# Patient Record
Sex: MALE | Race: WHITE | NOT HISPANIC OR LATINO | Employment: PART TIME | URBAN - METROPOLITAN AREA
[De-identification: names, ages, dates, MRNs, and addresses within clinical notes are randomized per-mention and may not be internally consistent; named-entity substitution may affect disease eponyms.]

---

## 2023-02-22 LAB — PROSTATE SPECIFIC AG (NG/ML) IN SER/PLAS: 4.99 NG/ML (ref 0–4)

## 2023-07-07 DIAGNOSIS — I10 BENIGN ESSENTIAL HYPERTENSION: ICD-10-CM

## 2023-07-07 DIAGNOSIS — M54.16 LUMBAR RADICULOPATHY: ICD-10-CM

## 2023-07-07 RX ORDER — AMLODIPINE BESYLATE 10 MG/1
TABLET ORAL
Qty: 90 TABLET | Refills: 3 | Status: SHIPPED | OUTPATIENT
Start: 2023-07-07

## 2023-07-07 RX ORDER — MELOXICAM 15 MG/1
TABLET ORAL
Qty: 90 TABLET | Refills: 3 | Status: SHIPPED | OUTPATIENT
Start: 2023-07-07

## 2023-07-07 RX ORDER — LOSARTAN POTASSIUM 50 MG/1
TABLET ORAL
Qty: 90 TABLET | Refills: 3 | Status: SHIPPED | OUTPATIENT
Start: 2023-07-07 | End: 2023-08-30

## 2023-08-04 DIAGNOSIS — F41.1 GENERALIZED ANXIETY DISORDER: ICD-10-CM

## 2023-08-04 RX ORDER — ESCITALOPRAM OXALATE 20 MG/1
20 TABLET ORAL DAILY
COMMUNITY
Start: 2023-03-17 | End: 2023-08-04 | Stop reason: SDUPTHER

## 2023-08-04 NOTE — TELEPHONE ENCOUNTER
Rx Refill Request Telephone Encounter    Name:  Nelson Kulkarni  :  [id number]  Medication Name:    Escitalopram 20mg 1 tab every day - 90 day     [facility]  Date of last appointment:  2022  Date of next appointment:  23

## 2023-08-05 RX ORDER — ESCITALOPRAM OXALATE 20 MG/1
20 TABLET ORAL DAILY
Qty: 90 TABLET | Refills: 0 | Status: SHIPPED | OUTPATIENT
Start: 2023-08-05 | End: 2023-08-30 | Stop reason: ALTCHOICE

## 2023-08-10 ENCOUNTER — TELEPHONE (OUTPATIENT)
Dept: PRIMARY CARE | Facility: CLINIC | Age: 68
End: 2023-08-10
Payer: COMMERCIAL

## 2023-08-10 DIAGNOSIS — F41.1 GENERALIZED ANXIETY DISORDER: ICD-10-CM

## 2023-08-10 NOTE — TELEPHONE ENCOUNTER
PT states that [facility] will not refill escitalopram (Lexapro) 20 mg tablet with out clarification or confirmation. Requesting the refill.

## 2023-08-23 PROBLEM — R53.81 MALAISE AND FATIGUE: Status: ACTIVE | Noted: 2023-08-23

## 2023-08-23 PROBLEM — M54.50 LOW BACK PAIN: Status: ACTIVE | Noted: 2023-08-23

## 2023-08-23 PROBLEM — M54.32 SCIATICA OF LEFT SIDE: Status: ACTIVE | Noted: 2023-08-23

## 2023-08-23 PROBLEM — B02.29 POSTHERPETIC NEURALGIA: Status: ACTIVE | Noted: 2023-08-23

## 2023-08-23 PROBLEM — R53.83 MALAISE AND FATIGUE: Status: ACTIVE | Noted: 2023-08-23

## 2023-08-23 PROBLEM — E78.2 COMBINED HYPERLIPIDEMIA: Status: ACTIVE | Noted: 2023-08-23

## 2023-08-23 PROBLEM — R29.3 ABNORMAL POSTURE: Status: ACTIVE | Noted: 2023-08-23

## 2023-08-23 PROBLEM — G47.01 INSOMNIA DUE TO MEDICAL CONDITION: Status: ACTIVE | Noted: 2023-08-23

## 2023-08-23 PROBLEM — I10 BENIGN ESSENTIAL HYPERTENSION: Status: ACTIVE | Noted: 2023-08-23

## 2023-08-23 PROBLEM — M47.12 CERVICAL ARTHRITIS WITH MYELOPATHY: Status: ACTIVE | Noted: 2023-08-23

## 2023-08-23 PROBLEM — M51.26 LUMBAR HERNIATED DISC: Status: ACTIVE | Noted: 2023-08-23

## 2023-08-23 PROBLEM — M54.16 LUMBAR RADICULOPATHY: Status: ACTIVE | Noted: 2023-08-23

## 2023-08-23 PROBLEM — B02.9 SHINGLES: Status: RESOLVED | Noted: 2023-08-23 | Resolved: 2023-08-23

## 2023-08-23 RX ORDER — ESCITALOPRAM OXALATE 10 MG/1
10 TABLET ORAL DAILY
COMMUNITY
Start: 2023-08-10 | End: 2024-01-10 | Stop reason: SDUPTHER

## 2023-08-23 RX ORDER — SILDENAFIL CITRATE 20 MG/1
TABLET ORAL
COMMUNITY
Start: 2018-12-30

## 2023-08-23 RX ORDER — CYCLOBENZAPRINE HCL 10 MG
1 TABLET ORAL NIGHTLY PRN
COMMUNITY
Start: 2019-12-10 | End: 2023-08-30 | Stop reason: ALTCHOICE

## 2023-08-23 RX ORDER — ALPRAZOLAM 1 MG/1
TABLET ORAL
COMMUNITY

## 2023-08-29 ASSESSMENT — PROMIS GLOBAL HEALTH SCALE
RATE_PHYSICAL_HEALTH: VERY GOOD
RATE_QUALITY_OF_LIFE: VERY GOOD
RATE_MENTAL_HEALTH: VERY GOOD
EMOTIONAL_PROBLEMS: SOMETIMES
RATE_SOCIAL_SATISFACTION: VERY GOOD
RATE_AVERAGE_FATIGUE: MILD
RATE_AVERAGE_PAIN: 2
CARRYOUT_PHYSICAL_ACTIVITIES: COMPLETELY
CARRYOUT_SOCIAL_ACTIVITIES: VERY GOOD
RATE_GENERAL_HEALTH: VERY GOOD

## 2023-08-30 ENCOUNTER — OFFICE VISIT (OUTPATIENT)
Dept: PRIMARY CARE | Facility: CLINIC | Age: 68
End: 2023-08-30
Payer: COMMERCIAL

## 2023-08-30 VITALS
OXYGEN SATURATION: 97 % | HEART RATE: 57 BPM | SYSTOLIC BLOOD PRESSURE: 160 MMHG | WEIGHT: 192 LBS | HEIGHT: 71 IN | BODY MASS INDEX: 26.88 KG/M2 | DIASTOLIC BLOOD PRESSURE: 75 MMHG

## 2023-08-30 DIAGNOSIS — M48.061 SPINAL STENOSIS OF LUMBAR REGION WITHOUT NEUROGENIC CLAUDICATION: ICD-10-CM

## 2023-08-30 DIAGNOSIS — I10 BENIGN ESSENTIAL HYPERTENSION: ICD-10-CM

## 2023-08-30 DIAGNOSIS — Z00.00 WELL ADULT EXAM: Primary | ICD-10-CM

## 2023-08-30 DIAGNOSIS — L25.5 RHUS DERMATITIS: ICD-10-CM

## 2023-08-30 DIAGNOSIS — T78.40XS ALLERGY, SEQUELA: ICD-10-CM

## 2023-08-30 DIAGNOSIS — M51.26 LUMBAR HERNIATED DISC: ICD-10-CM

## 2023-08-30 DIAGNOSIS — R97.20 ELEVATED PSA: ICD-10-CM

## 2023-08-30 DIAGNOSIS — E78.2 COMBINED HYPERLIPIDEMIA: ICD-10-CM

## 2023-08-30 PROCEDURE — 1159F MED LIST DOCD IN RCRD: CPT | Performed by: FAMILY MEDICINE

## 2023-08-30 PROCEDURE — 1160F RVW MEDS BY RX/DR IN RCRD: CPT | Performed by: FAMILY MEDICINE

## 2023-08-30 PROCEDURE — 99397 PER PM REEVAL EST PAT 65+ YR: CPT | Performed by: FAMILY MEDICINE

## 2023-08-30 PROCEDURE — 3078F DIAST BP <80 MM HG: CPT | Performed by: FAMILY MEDICINE

## 2023-08-30 PROCEDURE — 3077F SYST BP >= 140 MM HG: CPT | Performed by: FAMILY MEDICINE

## 2023-08-30 PROCEDURE — 1036F TOBACCO NON-USER: CPT | Performed by: FAMILY MEDICINE

## 2023-08-30 RX ORDER — GABAPENTIN 300 MG/1
300 CAPSULE ORAL DAILY
Qty: 90 CAPSULE | Refills: 3
Start: 2023-08-30 | End: 2023-10-17 | Stop reason: SDUPTHER

## 2023-08-30 RX ORDER — TRIAMCINOLONE ACETONIDE 5 MG/G
CREAM TOPICAL 3 TIMES DAILY
Qty: 30 G | Refills: 1 | Status: SHIPPED | OUTPATIENT
Start: 2023-08-30 | End: 2023-09-29

## 2023-08-30 RX ORDER — EPINEPHRINE 0.3 MG/.3ML
1 INJECTION INTRAMUSCULAR AS NEEDED
COMMUNITY
End: 2023-08-30 | Stop reason: SDUPTHER

## 2023-08-30 RX ORDER — EPINEPHRINE 0.3 MG/.3ML
1 INJECTION INTRAMUSCULAR AS NEEDED
Qty: 1 EACH | Refills: 1 | Status: SHIPPED | OUTPATIENT
Start: 2023-08-30

## 2023-08-30 RX ORDER — PREDNISONE 10 MG/1
TABLET ORAL
Qty: 14 TABLET | Refills: 0 | Status: SHIPPED | OUTPATIENT
Start: 2023-08-30 | End: 2023-09-07

## 2023-08-30 RX ORDER — LOSARTAN POTASSIUM AND HYDROCHLOROTHIAZIDE 12.5; 1 MG/1; MG/1
1 TABLET ORAL DAILY
Qty: 90 TABLET | Refills: 3 | Status: SHIPPED | OUTPATIENT
Start: 2023-08-30 | End: 2024-08-29

## 2023-08-30 NOTE — PROGRESS NOTES
ROS :  ( No or Yes )  Do you have:  Any eye problems:    N  2. Frequent nasal congestion or sneezing:  Y, ever since covid, allergy pills help  3. Difficulty hearing:  N  4. Ear problems:   N  Are you troubled by:  5. Asthma or wheezing:   N  6. Frequent cough:   N  7. Shortness of breath:N  8. Hemoptysis: N  9. Hx of TB: N  Do you have or have you been told you had:  10. High blood pressure: Y, treated  11. Heart disease: N  12. Heart murmur: N  Do you ever have:  13. Chest pain or pressure with exertion:N  14. Leg pains with walking up hill: N  15. Fast heartbeat or palpitations:N  16. Varicose veins: Y  Do you have or are you troubled by:  17. Difficulty swallowing foods or liquids: N  18. Abdominal pains: N  19. Frequent indigestion or heartburn: N  20. Constipation: N  21. Diarrhea or loose stools: N  Has there been a definite change:  22. In weight recently: N  23. In bowel movements: Y, not as often as used to since 1/2023  Have you ever had or been told you have:  24. An ulcer: N  25. Black stools: N  26. Jaundice, hepatitis or liver problems: N  27. Gallstones or gallbladder problems: N  28. Stomach or intestinal problems: N  Have you ever:  29. Vomited blood : N  30. Blood in bowel movements: N  31. Been anemic or been treated for blood problems: N  32. Had sickle cell trait or anemia: N  33. Been refused as a blood donor:   Have you had or do you have:  34. Problems with your kidney, bladder, or prostate: Y, elevated psa  35. Loss of control of your urine: N  36. Pain or burning with urination: N  37. Blood in your urine: N  38. Trouble starting flow of urine: N  39. Frequent urination at night: N  Have you ever been treated for or told you had:  40. Venereal disease: N  Do you have:  41. Any skin problems: Y, eczema nose and eyebrows, cortosone 10 helps, worse in winter  42. Diabetes: N  43. Thyroid disease: N  Are you troubled by:  44. Frequent back pain: Y, hx spinal stenosis and sciatica  45. Pain or  "swelling around joints: N  Have you ever:  46. Broken any bones: Y  Are you troubled by:  47. Frequent headaches: N  48. Dizziness: N  49. Had Seizures or convulsions: N  50. Temporarily lost control of your hand or foot : N   51. Had a stroke or been paralyzed : N  52. Temporarily lost your ability to speak: N  53. Fainted or lost consciousness: N  Have you ever had:  54. Hallucinations: N  55. Much trouble with Nervousness: Y  56. Do you take medications for your nerves: Y  57. Trouble falling asleep or staying asleep: N  58. Do you feel tired even after a good night sleep: N  59. Do you often feel down in the dumps or depressed: N  60. Do you often feel like crying without any reason: N  61. Do you think that you may be using alcohol excessively: N  62. Do you use any street drugs : N    Do have any other medical problems that are concerning to you :  Subjective   Patient ID: Nelson Kulkarni is a 68 y.o. male who presents for Annual Exam (Since having COVID [place], sinus have been draining ever since, clear drainage as soon as he gets up in the am draining down the back of his throat and nose. /Has had more back pain recently \"like sciatica lightening goes down both cheeks\", usually stretches helps, but it isn't now).    HPI     Review of Systems    Objective   /76   Pulse 57   Ht 1.791 m (5' 10.5\")   Wt 87.1 kg (192 lb)   SpO2 97%   BMI 27.16 kg/m²     Physical Exam  HENT:      Head: Normocephalic and atraumatic.      Nose: Nose normal.      Mouth/Throat:      Mouth: Mucous membranes are moist.      Pharynx: No oropharyngeal exudate.   Eyes:      Extraocular Movements: Extraocular movements intact.      Conjunctiva/sclera: Conjunctivae normal.      Pupils: Pupils are equal, round, and reactive to light.   Cardiovascular:      Rate and Rhythm: Normal rate and regular rhythm.   Pulmonary:      Effort: Pulmonary effort is normal.   Abdominal:      General: There is no distension.      Palpations: " Abdomen is soft.   Musculoskeletal:      Cervical back: Normal range of motion and neck supple.   Lymphadenopathy:      Cervical: No cervical adenopathy.   Neurological:      General: No focal deficit present.      Mental Status: He is alert.   Psychiatric:         Attention and Perception: Attention normal.         Speech: Speech normal.         Behavior: Behavior is cooperative.       Few erythematous excoriated papules over the right ventral forearm, and left dorsal leg has a linear arrangement of 1 to 2 mm diameter papules  Assessment/Plan   Diagnoses and all orders for this visit:  Well adult exam  Allergy, sequela  -     EPINEPHrine (EpiPen 2-Grant) 0.3 mg/0.3 mL injection syringe; Inject 0.3 mL (0.3 mg) as directed if needed for anaphylaxis. Inject into upper leg. Call 911 after use.  Rhus dermatitis  -     triamcinolone (Kenalog) 0.5 % cream; Apply topically 3 times a day.  Spinal stenosis of lumbar region without neurogenic claudication  -     predniSONE (Deltasone) 10 mg tablet; Take 1 tablet (10 mg) by mouth 3 times a day for 2 days, THEN 1 tablet (10 mg) 2 times a day for 2 days, THEN 1 tablet (10 mg) once daily for 4 days.  Benign essential hypertension  -     losartan-hydrochlorothiazide (Hyzaar) 100-12.5 mg tablet; Take 1 tablet by mouth once daily.  -     Lipid Panel; Future  -     CBC and Auto Differential; Future  -     Comprehensive Metabolic Panel; Future  Elevated PSA  -     Prostate Specific Antigen; Future  Combined hyperlipidemia  -     Lipid Panel; Future  -     CBC and Auto Differential; Future  -     Comprehensive Metabolic Panel; Future  Lumbar herniated disc  -     gabapentin (Neurontin) 300 mg capsule; Take 1 capsule (300 mg) by mouth once daily.   LM discussed  Call blood pressures in 2 weeks  Recheck 6 to 12 months sooner if any issues arise

## 2023-10-05 ENCOUNTER — HOSPITAL ENCOUNTER (EMERGENCY)
Facility: HOSPITAL | Age: 68
Discharge: HOME | End: 2023-10-05
Attending: STUDENT IN AN ORGANIZED HEALTH CARE EDUCATION/TRAINING PROGRAM | Admitting: STUDENT IN AN ORGANIZED HEALTH CARE EDUCATION/TRAINING PROGRAM
Payer: COMMERCIAL

## 2023-10-05 VITALS
OXYGEN SATURATION: 97 % | TEMPERATURE: 97.7 F | RESPIRATION RATE: 16 BRPM | HEART RATE: 84 BPM | SYSTOLIC BLOOD PRESSURE: 170 MMHG | DIASTOLIC BLOOD PRESSURE: 84 MMHG | BODY MASS INDEX: 29.55 KG/M2 | HEIGHT: 68 IN | WEIGHT: 195 LBS

## 2023-10-05 DIAGNOSIS — T63.441A ALLERGIC REACTION TO BEE STING: Primary | ICD-10-CM

## 2023-10-05 PROCEDURE — 96374 THER/PROPH/DIAG INJ IV PUSH: CPT

## 2023-10-05 PROCEDURE — 2500000004 HC RX 250 GENERAL PHARMACY W/ HCPCS (ALT 636 FOR OP/ED): Performed by: HEALTH CARE PROVIDER

## 2023-10-05 PROCEDURE — 96375 TX/PRO/DX INJ NEW DRUG ADDON: CPT

## 2023-10-05 PROCEDURE — 99284 EMERGENCY DEPT VISIT MOD MDM: CPT | Mod: 25 | Performed by: STUDENT IN AN ORGANIZED HEALTH CARE EDUCATION/TRAINING PROGRAM

## 2023-10-05 RX ORDER — FAMOTIDINE 10 MG/ML
20 INJECTION INTRAVENOUS ONCE
Status: COMPLETED | OUTPATIENT
Start: 2023-10-05 | End: 2023-10-05

## 2023-10-05 RX ADMIN — METHYLPREDNISOLONE SODIUM SUCCINATE 125 MG: 125 INJECTION, POWDER, FOR SOLUTION INTRAMUSCULAR; INTRAVENOUS at 18:13

## 2023-10-05 RX ADMIN — FAMOTIDINE 20 MG: 10 INJECTION, SOLUTION INTRAVENOUS at 18:14

## 2023-10-05 ASSESSMENT — LIFESTYLE VARIABLES
EVER HAD A DRINK FIRST THING IN THE MORNING TO STEADY YOUR NERVES TO GET RID OF A HANGOVER: NO
HAVE PEOPLE ANNOYED YOU BY CRITICIZING YOUR DRINKING: NO
HAVE YOU EVER FELT YOU SHOULD CUT DOWN ON YOUR DRINKING: NO
EVER FELT BAD OR GUILTY ABOUT YOUR DRINKING: NO

## 2023-10-05 ASSESSMENT — COLUMBIA-SUICIDE SEVERITY RATING SCALE - C-SSRS
2. HAVE YOU ACTUALLY HAD ANY THOUGHTS OF KILLING YOURSELF?: NO
1. IN THE PAST MONTH, HAVE YOU WISHED YOU WERE DEAD OR WISHED YOU COULD GO TO SLEEP AND NOT WAKE UP?: NO
6. HAVE YOU EVER DONE ANYTHING, STARTED TO DO ANYTHING, OR PREPARED TO DO ANYTHING TO END YOUR LIFE?: NO

## 2023-10-05 ASSESSMENT — PAIN SCALES - GENERAL: PAINLEVEL_OUTOF10: 0 - NO PAIN

## 2023-10-05 ASSESSMENT — PAIN - FUNCTIONAL ASSESSMENT: PAIN_FUNCTIONAL_ASSESSMENT: 0-10

## 2023-10-05 NOTE — ED NOTES
Patient was sitting on  and got stung by a bee on his leg. Known allergy to bees. Gave self Epi pen. Ambulated to room Call light in reach No complaints of CP/SOB     Rachid Escudero RN  10/05/23 [id number]

## 2023-10-17 DIAGNOSIS — M51.26 LUMBAR HERNIATED DISC: ICD-10-CM

## 2023-10-17 RX ORDER — GABAPENTIN 300 MG/1
300 CAPSULE ORAL DAILY
Qty: 90 CAPSULE | Refills: 1 | Status: SHIPPED | OUTPATIENT
Start: 2023-10-17 | End: 2024-04-08

## 2023-10-17 NOTE — TELEPHONE ENCOUNTER
MEDICATION REFILL    Pharmacy Name:  [facility]  Medication requested    Gabapentin  300 mg  Dosage    1 tab daily  Quantity     90 days    Allergies     NKA  Date of last visit    08/30/2023  Date of Next Appointment [  ]

## 2024-01-05 ENCOUNTER — ANCILLARY PROCEDURE (OUTPATIENT)
Dept: RADIOLOGY | Facility: CLINIC | Age: 69
End: 2024-01-05
Payer: COMMERCIAL

## 2024-01-05 ENCOUNTER — OFFICE VISIT (OUTPATIENT)
Dept: PRIMARY CARE | Facility: CLINIC | Age: 69
End: 2024-01-05
Payer: COMMERCIAL

## 2024-01-05 VITALS
BODY MASS INDEX: 30.01 KG/M2 | HEART RATE: 77 BPM | HEIGHT: 68 IN | OXYGEN SATURATION: 98 % | SYSTOLIC BLOOD PRESSURE: 162 MMHG | DIASTOLIC BLOOD PRESSURE: 80 MMHG | WEIGHT: 198 LBS

## 2024-01-05 DIAGNOSIS — M48.061 SPINAL STENOSIS OF LUMBAR REGION, UNSPECIFIED WHETHER NEUROGENIC CLAUDICATION PRESENT: ICD-10-CM

## 2024-01-05 DIAGNOSIS — E78.2 COMBINED HYPERLIPIDEMIA: ICD-10-CM

## 2024-01-05 DIAGNOSIS — M48.061 SPINAL STENOSIS OF LUMBAR REGION, UNSPECIFIED WHETHER NEUROGENIC CLAUDICATION PRESENT: Primary | ICD-10-CM

## 2024-01-05 DIAGNOSIS — M54.31 SCIATICA OF RIGHT SIDE: ICD-10-CM

## 2024-01-05 DIAGNOSIS — I10 BENIGN ESSENTIAL HYPERTENSION: ICD-10-CM

## 2024-01-05 DIAGNOSIS — R97.20 ELEVATED PSA: ICD-10-CM

## 2024-01-05 PROCEDURE — 72100 X-RAY EXAM L-S SPINE 2/3 VWS: CPT

## 2024-01-05 PROCEDURE — 72100 X-RAY EXAM L-S SPINE 2/3 VWS: CPT | Performed by: RADIOLOGY

## 2024-01-05 PROCEDURE — 3079F DIAST BP 80-89 MM HG: CPT | Performed by: FAMILY MEDICINE

## 2024-01-05 PROCEDURE — 99214 OFFICE O/P EST MOD 30 MIN: CPT | Performed by: FAMILY MEDICINE

## 2024-01-05 PROCEDURE — 1036F TOBACCO NON-USER: CPT | Performed by: FAMILY MEDICINE

## 2024-01-05 PROCEDURE — 3077F SYST BP >= 140 MM HG: CPT | Performed by: FAMILY MEDICINE

## 2024-01-05 PROCEDURE — 1159F MED LIST DOCD IN RCRD: CPT | Performed by: FAMILY MEDICINE

## 2024-01-05 PROCEDURE — 1126F AMNT PAIN NOTED NONE PRSNT: CPT | Performed by: FAMILY MEDICINE

## 2024-01-05 ASSESSMENT — PATIENT HEALTH QUESTIONNAIRE - PHQ9
SUM OF ALL RESPONSES TO PHQ9 QUESTIONS 1 AND 2: 0
2. FEELING DOWN, DEPRESSED OR HOPELESS: NOT AT ALL
1. LITTLE INTEREST OR PLEASURE IN DOING THINGS: NOT AT ALL

## 2024-01-05 ASSESSMENT — ENCOUNTER SYMPTOMS
UNEXPECTED WEIGHT CHANGE: 0
CONSTIPATION: 0
VOMITING: 0
PALPITATIONS: 0
HIP PAIN: 1

## 2024-01-05 ASSESSMENT — COLUMBIA-SUICIDE SEVERITY RATING SCALE - C-SSRS
1. IN THE PAST MONTH, HAVE YOU WISHED YOU WERE DEAD OR WISHED YOU COULD GO TO SLEEP AND NOT WAKE UP?: NO
2. HAVE YOU ACTUALLY HAD ANY THOUGHTS OF KILLING YOURSELF?: NO
6. HAVE YOU EVER DONE ANYTHING, STARTED TO DO ANYTHING, OR PREPARED TO DO ANYTHING TO END YOUR LIFE?: NO

## 2024-01-05 NOTE — PROGRESS NOTES
"Subjective   Patient ID: Nelson Kulkarni is a 68 y.o. male who presents for Hip Pain (Right hip for about 2 weeks).    Hip Pain     Pain is in the right lateral hip and radiates occasionally into the right leg and right foot  Patient says he did slip on snow 1 to 2 months ago and stopped up short but did not fall  No change in bowel or bladder and no numbness tingling weakness  Pain resolves promptly with sitting down or bending forward  No skin rash  No fever chills    Review of Systems   Constitutional:  Negative for unexpected weight change.   HENT:  Negative for congestion and ear discharge.    Cardiovascular:  Negative for chest pain and palpitations.   Gastrointestinal:  Negative for constipation and vomiting.   All other systems reviewed and are negative.      Objective   /80   Pulse 77   Ht 1.727 m (5' 8\")   Wt 89.8 kg (198 lb)   SpO2 98%   BMI 30.11 kg/m²     Physical Exam  HENT:      Head: Normocephalic and atraumatic.      Nose: Nose normal.      Mouth/Throat:      Mouth: Mucous membranes are moist.      Pharynx: No oropharyngeal exudate.   Eyes:      Extraocular Movements: Extraocular movements intact.      Conjunctiva/sclera: Conjunctivae normal.      Pupils: Pupils are equal, round, and reactive to light.   Cardiovascular:      Rate and Rhythm: Normal rate and regular rhythm.   Pulmonary:      Effort: Pulmonary effort is normal.   Abdominal:      General: There is no distension.      Palpations: Abdomen is soft.   Musculoskeletal:      Cervical back: Normal range of motion and neck supple.   Lymphadenopathy:      Cervical: No cervical adenopathy.   Neurological:      General: No focal deficit present.      Mental Status: He is alert.   Psychiatric:         Attention and Perception: Attention normal.         Speech: Speech normal.         Behavior: Behavior is cooperative.     There is slight tenderness in the bilateral SI joints, no skin change, no pain with hip rotation or SLR, DTRs 2+ knee " and ankle on the left but 0 to trace on the right which she says is longstanding  Light touch and motor intact lower extremities    Assessment/Plan   Diagnoses and all orders for this visit:  Spinal stenosis of lumbar region, unspecified whether neurogenic claudication present  Comments:  Recommend start home PT and consider formal PT eval  Plan x-rays  Orders:  -     XR lumbar spine 2-3 views; Future  Sciatica of right side  -     XR lumbar spine 2-3 views; Future  Benign essential hypertension  Comments:  Order given for labs again since he did not complete them last time  Orders:  -     Lipid Panel; Future  -     CBC and Auto Differential; Future  -     Comprehensive Metabolic Panel; Future  Combined hyperlipidemia  -     Lipid Panel; Future  -     CBC and Auto Differential; Future  -     Comprehensive Metabolic Panel; Future  Elevated PSA  -     Prostate Specific Antigen; Future    Recheck 1 to 2 weeks if not better sooner if worse

## 2024-01-05 NOTE — LETTER
January 5, 2024     Patient: Nelson Kulkarni   YOB: 1955   Date of Visit: 1/5/2024       To Whom It May Concern:    Nelson Kulkarni was seen in my clinic on 1/5/2024 at 9:45 am. Please excuse Nelson for his absence from work on this day to make the appointment. He is released to return to work 1/13/2024 without restrictions.     If you have any questions or concerns, please don't hesitate to call.         Sincerely,         Booker Ford MD        CC: No Recipients

## 2024-01-10 DIAGNOSIS — F41.1 GENERALIZED ANXIETY DISORDER: ICD-10-CM

## 2024-01-10 DIAGNOSIS — M48.061 SPINAL STENOSIS OF LUMBAR REGION, UNSPECIFIED WHETHER NEUROGENIC CLAUDICATION PRESENT: Primary | ICD-10-CM

## 2024-01-10 RX ORDER — ESCITALOPRAM OXALATE 10 MG/1
10 TABLET ORAL DAILY
Qty: 90 TABLET | Refills: 1 | Status: SHIPPED | OUTPATIENT
Start: 2024-01-10

## 2024-01-10 NOTE — TELEPHONE ENCOUNTER
Rx Refill Request Telephone Encounter    Name:  Nelson Kulkarni  :  [id number]  Medication Name:    escitalopram (Lexapro) 10 mg tablet Take 1 tablet (10 mg) by mouth once daily. - 90 day     [facility]  Date of last appointment:  2024  Date of next appointment:  na  Best number to reach patient:  [phone]

## 2024-01-15 ENCOUNTER — TELEPHONE (OUTPATIENT)
Dept: PRIMARY CARE | Facility: CLINIC | Age: 69
End: 2024-01-15
Payer: COMMERCIAL

## 2024-01-15 NOTE — TELEPHONE ENCOUNTER
Pt states his hip pain is worsening with stretching.  He would like to see if Dr LAM would extend his off work letter to include this week with return to work on 01/22/2024.  Someone will  from office when/if written.

## 2024-01-15 NOTE — TELEPHONE ENCOUNTER
Spoke with pt. He does have an appt. With Dr. Sloan just wishes it was sooner. Asked if he would like me to [facility] and he [facility]. Letter sent.

## 2024-01-15 NOTE — LETTER
January 15, 2024     Patient: Nelson Kulkarni   YOB: 1955   Date of Visit: 1/5/2024       To Whom It May Concern:    Nelson Kulkarni was seen in my clinic on 1/5/2024 ?. Please excuse Nelson for his absence from work on this day to make the appointment. Due to pain he is still unable to return to work this week and will have to extend his return to work date to 1/22/2024    If you have any questions or concerns, please don't hesitate to call.         Sincerely,         Booker Ford MD

## 2024-01-19 ENCOUNTER — LAB (OUTPATIENT)
Dept: LAB | Facility: LAB | Age: 69
End: 2024-01-19
Payer: COMMERCIAL

## 2024-01-19 DIAGNOSIS — E78.2 COMBINED HYPERLIPIDEMIA: ICD-10-CM

## 2024-01-19 DIAGNOSIS — R97.20 ELEVATED PSA: ICD-10-CM

## 2024-01-19 DIAGNOSIS — I10 BENIGN ESSENTIAL HYPERTENSION: ICD-10-CM

## 2024-01-19 LAB
ALBUMIN SERPL BCP-MCNC: 4.4 G/DL (ref 3.4–5)
ALP SERPL-CCNC: 50 U/L (ref 33–136)
ALT SERPL W P-5'-P-CCNC: 28 U/L (ref 10–52)
ANION GAP SERPL CALC-SCNC: 16 MMOL/L (ref 10–20)
AST SERPL W P-5'-P-CCNC: 22 U/L (ref 9–39)
BASOPHILS # BLD AUTO: 0.05 X10*3/UL (ref 0–0.1)
BASOPHILS NFR BLD AUTO: 0.5 %
BILIRUB SERPL-MCNC: 0.8 MG/DL (ref 0–1.2)
BUN SERPL-MCNC: 23 MG/DL (ref 6–23)
CALCIUM SERPL-MCNC: 9.5 MG/DL (ref 8.6–10.3)
CHLORIDE SERPL-SCNC: 102 MMOL/L (ref 98–107)
CHOLEST SERPL-MCNC: 242 MG/DL (ref 0–199)
CHOLESTEROL/HDL RATIO: 2.3
CO2 SERPL-SCNC: 23 MMOL/L (ref 21–32)
CREAT SERPL-MCNC: 0.96 MG/DL (ref 0.5–1.3)
EGFRCR SERPLBLD CKD-EPI 2021: 87 ML/MIN/1.73M*2
EOSINOPHIL # BLD AUTO: 0.06 X10*3/UL (ref 0–0.7)
EOSINOPHIL NFR BLD AUTO: 0.6 %
ERYTHROCYTE [DISTWIDTH] IN BLOOD BY AUTOMATED COUNT: 12 % (ref 11.5–14.5)
GLUCOSE SERPL-MCNC: 75 MG/DL (ref 74–99)
HCT VFR BLD AUTO: 44.9 % (ref 41–52)
HDLC SERPL-MCNC: 106.1 MG/DL
HGB BLD-MCNC: 15.6 G/DL (ref 13.5–17.5)
IMM GRANULOCYTES # BLD AUTO: 0.03 X10*3/UL (ref 0–0.7)
IMM GRANULOCYTES NFR BLD AUTO: 0.3 % (ref 0–0.9)
LDLC SERPL CALC-MCNC: 126 MG/DL
LYMPHOCYTES # BLD AUTO: 3.97 X10*3/UL (ref 1.2–4.8)
LYMPHOCYTES NFR BLD AUTO: 41.4 %
MCH RBC QN AUTO: 34.8 PG (ref 26–34)
MCHC RBC AUTO-ENTMCNC: 34.7 G/DL (ref 32–36)
MCV RBC AUTO: 100 FL (ref 80–100)
MONOCYTES # BLD AUTO: 0.84 X10*3/UL (ref 0.1–1)
MONOCYTES NFR BLD AUTO: 8.8 %
NEUTROPHILS # BLD AUTO: 4.63 X10*3/UL (ref 1.2–7.7)
NEUTROPHILS NFR BLD AUTO: 48.4 %
NON HDL CHOLESTEROL: 136 MG/DL (ref 0–149)
NRBC BLD-RTO: 0 /100 WBCS (ref 0–0)
PLATELET # BLD AUTO: 260 X10*3/UL (ref 150–450)
POTASSIUM SERPL-SCNC: 3.8 MMOL/L (ref 3.5–5.3)
PROT SERPL-MCNC: 7 G/DL (ref 6.4–8.2)
PSA SERPL-MCNC: 6.57 NG/ML
RBC # BLD AUTO: 4.48 X10*6/UL (ref 4.5–5.9)
SODIUM SERPL-SCNC: 137 MMOL/L (ref 136–145)
TRIGL SERPL-MCNC: 50 MG/DL (ref 0–149)
VLDL: 10 MG/DL (ref 0–40)
WBC # BLD AUTO: 9.6 X10*3/UL (ref 4.4–11.3)

## 2024-01-19 PROCEDURE — 84153 ASSAY OF PSA TOTAL: CPT

## 2024-01-19 PROCEDURE — 85025 COMPLETE CBC W/AUTO DIFF WBC: CPT

## 2024-01-19 PROCEDURE — 36415 COLL VENOUS BLD VENIPUNCTURE: CPT

## 2024-01-19 PROCEDURE — 80061 LIPID PANEL: CPT

## 2024-01-19 PROCEDURE — 80053 COMPREHEN METABOLIC PANEL: CPT

## 2024-01-22 ENCOUNTER — TELEPHONE (OUTPATIENT)
Dept: PRIMARY CARE | Facility: CLINIC | Age: 69
End: 2024-01-22
Payer: COMMERCIAL

## 2024-02-02 ENCOUNTER — OFFICE VISIT (OUTPATIENT)
Dept: ORTHOPEDIC SURGERY | Facility: CLINIC | Age: 69
End: 2024-02-02
Payer: COMMERCIAL

## 2024-02-02 ENCOUNTER — HOSPITAL ENCOUNTER (OUTPATIENT)
Dept: RADIOLOGY | Facility: HOSPITAL | Age: 69
Discharge: HOME | End: 2024-02-02
Payer: COMMERCIAL

## 2024-02-02 DIAGNOSIS — M25.551 ACUTE HIP PAIN, RIGHT: ICD-10-CM

## 2024-02-02 DIAGNOSIS — M25.551 ACUTE HIP PAIN, RIGHT: Primary | ICD-10-CM

## 2024-02-02 PROCEDURE — 1159F MED LIST DOCD IN RCRD: CPT | Performed by: ORTHOPAEDIC SURGERY

## 2024-02-02 PROCEDURE — 73502 X-RAY EXAM HIP UNI 2-3 VIEWS: CPT | Mod: RIGHT SIDE | Performed by: RADIOLOGY

## 2024-02-02 PROCEDURE — 1126F AMNT PAIN NOTED NONE PRSNT: CPT | Performed by: ORTHOPAEDIC SURGERY

## 2024-02-02 PROCEDURE — 1036F TOBACCO NON-USER: CPT | Performed by: ORTHOPAEDIC SURGERY

## 2024-02-02 PROCEDURE — 73502 X-RAY EXAM HIP UNI 2-3 VIEWS: CPT | Mod: RT

## 2024-02-02 PROCEDURE — 1160F RVW MEDS BY RX/DR IN RCRD: CPT | Performed by: ORTHOPAEDIC SURGERY

## 2024-02-02 PROCEDURE — 99204 OFFICE O/P NEW MOD 45 MIN: CPT | Performed by: ORTHOPAEDIC SURGERY

## 2024-02-02 NOTE — PROGRESS NOTES
This is a consultation from Dr. Booker Ford MD for   Chief Complaint   Patient presents with    Right Hip - Pain       This is a 69 y.o. male who presents for evaluation of right hip pain.  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  He states he slipped over 4 weeks ago on his right foot and began having pain in his right hip.  It is mainly over the buttocks and posterior hip.  It is worse with walking or standing for longer than 15 or 20 minutes.  He is try taking anti-inflammatories and also use a oral steroid taper these have given him some relief.  No history of previous injections or surgeries in his lumbar spine or in his hip.  No pain going down his leg no numbness or tingling.    Physical Exam    There has been no interval change in this patient's past medical, surgical, medications, allergies, family history or social history since the most recent visit to a provider within our department. 14 point review of systems was performed, reviewed, and negative except for pertinent positives documented in the history of present illness.     Constitutional: well developed, well nourished male in no acute distress  Psychiatric: normal mood, appropriate affect  Eyes: sclera anicteric  HENT: normocephalic/atraumatic  CV: regular rate and rhythm   Respiratory: non labored breathing  Integumentary: no rash  Neurological: moves all extremities    Right hip exam: skin normal, no abrasions, wounds, or lacerations. nttp over greater trochanter. negative log roll, negative jerrell's test. flexion to 90 degrees without pain. no pain with flexion abduction and external rotation, reproduction of posterior hip pain with flexion adduction and internal rotation. neurovascularly intact distally        Xrays were ordered by me, they were reviewed and independently interpreted by me today, they show moderate to severe degenerative disease with joint space narrowing especially in the medial aspect of the  "joint    Procedures      Impression/Plan: This is a 69 y.o. male right hip arthritis, exacerbated by recent injury.  I had an in depth discussion with the patient regarding treatment options for arthritis and their relative risks and benefits. We reviewed surgical and nonsurgical option for treatment. Treatments include anti inflammatory medications, physical therapy, weight loss, activity modification, use of assistive devices, injection therapies. We discussed current prescriptions and risks and benefits of continuation of prescription medication as apporpriate. We discussed that arthritis is often progressive over time, an in end stage arthritis surgical interventions can be considered, including arthroplasty. All questions were answered and the patient voiced their understanding.  Continue nonsurgical treatment, anti-inflammatories as needed.  Will set him up with a guided cortisone injection.  I will see him back as needed    BMI Readings from Last 1 Encounters:   01/05/24 30.11 kg/m²      Lab Results   Component Value Date    CREATININE 0.96 01/19/2024     Tobacco Use: Medium Risk (1/5/2024)    Patient History     Smoking Tobacco Use: Former     Smokeless Tobacco Use: Never     Passive Exposure: Not on file      Computed MELD 3.0 unavailable. Necessary lab results were not found in the last year.  Computed MELD-Na unavailable. Necessary lab results were not found in the last year.       No results found for: \"HGBA1C\"  No results found for: \"STAPHMRSASCR\"  "

## 2024-02-02 NOTE — LETTER
February 2, 2024     Booker Ford MD  [address]  [address]  [facility] [address]    Patient: Nelson Kulkarni   YOB: 1955   Date of Visit: 2/2/2024       Dear Dr. Booker Ford MD:    Thank you for referring Nelson Kulkarni to me for evaluation. Below are my notes for this consultation.  If you have questions, please do not hesitate to call me. I look forward to following your patient along with you.       Sincerely,     Daniel Sloan MD      CC: No Recipients  ______________________________________________________________________________________    This is a consultation from Dr. Booker Ford MD for   Chief Complaint   Patient presents with   • Right Hip - Pain       This is a 69 y.o. male who presents for evaluation of right hip pain.  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  It is mainly over the buttocks and posterior hip.  It is worse with walking or standing for longer than 15 or 20 minutes.  He is try taking anti-inflammatories and also use a oral steroid taper these have given him some relief.  No history of previous injections or surgeries in his lumbar spine or in his hip.  No pain going down his leg no numbness or tingling.    Physical Exam    There has been no interval change in this patient's past medical, surgical, medications, allergies, family history or social history since the most recent visit to a provider within our department. 14 point review of systems was performed, reviewed, and negative except for pertinent positives documented in the history of present illness.     Constitutional: well developed, well nourished male in no acute distress  Psychiatric: normal mood, appropriate affect  Eyes: sclera anicteric  HENT: normocephalic/atraumatic  CV: regular rate and rhythm   Respiratory: non labored breathing  Integumentary: no rash  Neurological: moves all  extremities    Right hip exam: skin normal, no abrasions, wounds, or lacerations. nttp over greater trochanter. negative log roll, negative jerrell's test. flexion to 90 degrees without pain. no pain with flexion abduction and external rotation, reproduction of posterior hip pain with flexion adduction and internal rotation. neurovascularly intact distally        Xrays were ordered by me, they were reviewed and independently interpreted by me today, they show moderate to severe degenerative disease with joint space narrowing especially in the medial aspect of the joint    Procedures      Impression/Plan: This is a 69 y.o. male right hip arthritis, exacerbated by recent injury.  I had an in depth discussion with the patient regarding treatment options for arthritis and their relative risks and benefits. We reviewed surgical and nonsurgical option for treatment. Treatments include anti inflammatory medications, physical therapy, weight loss, activity modification, use of assistive devices, injection therapies. We discussed current prescriptions and risks and benefits of continuation of prescription medication as apporpriate. We discussed that arthritis is often progressive over time, an in end stage arthritis surgical interventions can be considered, including arthroplasty. All questions were answered and the patient voiced their understanding.  Continue nonsurgical treatment, anti-inflammatories as needed.  Will set him up with a guided cortisone injection.  I will see him back as needed    BMI Readings from Last 1 Encounters:   01/05/24 30.11 kg/m²      Lab Results   Component Value Date    CREATININE 0.96 01/19/2024     Tobacco Use: Medium Risk (1/5/2024)    Patient History    • Smoking Tobacco Use: Former    • Smokeless Tobacco Use: Never    • Passive Exposure: Not on file      Computed MELD 3.0 unavailable. Necessary lab results were not found in the last year.  Computed MELD-Na unavailable. Necessary lab results  "were not found in the last year.       No results found for: \"HGBA1C\"  No results found for: \"STAPHMRSASCR\"  "

## 2024-02-07 ENCOUNTER — HOSPITAL ENCOUNTER (OUTPATIENT)
Dept: RADIOLOGY | Facility: HOSPITAL | Age: 69
Discharge: HOME | End: 2024-02-07
Payer: COMMERCIAL

## 2024-02-07 DIAGNOSIS — M25.551 ACUTE HIP PAIN, RIGHT: ICD-10-CM

## 2024-02-07 PROCEDURE — 77002 NEEDLE LOCALIZATION BY XRAY: CPT | Mod: RT

## 2024-02-07 PROCEDURE — 2550000001 HC RX 255 CONTRASTS: Performed by: ORTHOPAEDIC SURGERY

## 2024-02-07 PROCEDURE — 77002 NEEDLE LOCALIZATION BY XRAY: CPT | Mod: RIGHT SIDE | Performed by: STUDENT IN AN ORGANIZED HEALTH CARE EDUCATION/TRAINING PROGRAM

## 2024-02-07 PROCEDURE — 20610 DRAIN/INJ JOINT/BURSA W/O US: CPT | Mod: RIGHT SIDE | Performed by: STUDENT IN AN ORGANIZED HEALTH CARE EDUCATION/TRAINING PROGRAM

## 2024-02-07 RX ORDER — BUPIVACAINE HYDROCHLORIDE 2.5 MG/ML
INJECTION, SOLUTION EPIDURAL; INFILTRATION; INTRACAUDAL
Status: DISCONTINUED
Start: 2024-02-07 | End: 2024-02-08 | Stop reason: HOSPADM

## 2024-02-07 RX ORDER — LIDOCAINE HYDROCHLORIDE 10 MG/ML
3 INJECTION, SOLUTION EPIDURAL; INFILTRATION; INTRACAUDAL; PERINEURAL ONCE
Status: CANCELLED | OUTPATIENT
Start: 2024-02-07 | End: 2024-02-07

## 2024-02-07 RX ORDER — TRIAMCINOLONE ACETONIDE 40 MG/ML
INJECTION, SUSPENSION INTRA-ARTICULAR; INTRAMUSCULAR
Status: DISCONTINUED
Start: 2024-02-07 | End: 2024-02-08 | Stop reason: HOSPADM

## 2024-02-07 RX ADMIN — IOHEXOL 1 ML: 240 INJECTION, SOLUTION INTRATHECAL; INTRAVASCULAR; INTRAVENOUS; ORAL at 15:07

## 2024-04-02 ENCOUNTER — APPOINTMENT (OUTPATIENT)
Dept: ORTHOPEDIC SURGERY | Facility: CLINIC | Age: 69
End: 2024-04-02
Payer: COMMERCIAL

## 2024-04-03 ENCOUNTER — OFFICE VISIT (OUTPATIENT)
Dept: UROLOGY | Facility: CLINIC | Age: 69
End: 2024-04-03
Payer: COMMERCIAL

## 2024-04-03 DIAGNOSIS — N52.9 ERECTILE DYSFUNCTION, UNSPECIFIED ERECTILE DYSFUNCTION TYPE: Primary | ICD-10-CM

## 2024-04-03 DIAGNOSIS — R97.20 ELEVATED PSA: ICD-10-CM

## 2024-04-03 PROCEDURE — 1160F RVW MEDS BY RX/DR IN RCRD: CPT | Performed by: STUDENT IN AN ORGANIZED HEALTH CARE EDUCATION/TRAINING PROGRAM

## 2024-04-03 PROCEDURE — 1159F MED LIST DOCD IN RCRD: CPT | Performed by: STUDENT IN AN ORGANIZED HEALTH CARE EDUCATION/TRAINING PROGRAM

## 2024-04-03 PROCEDURE — 99204 OFFICE O/P NEW MOD 45 MIN: CPT | Performed by: STUDENT IN AN ORGANIZED HEALTH CARE EDUCATION/TRAINING PROGRAM

## 2024-04-03 NOTE — PROGRESS NOTES
Subjective   Patient ID: Nelson Kulkarni is a 69 y.o. male who presents for elevated PSA.  HPI  69 y.o. male who presents for elevated PSA. He had previously seen a provider but no investigation done.     No urinary sxs. He has ED, well managed with Viagra.     Lab Results   Component Value Date    PSA 6.57 (H) 2024    PSA 4.99 (H) 2023    PSASCREEN 4.22 (H) 2022    PSASCREEN 6.32 (H) 2022    PSASCREEN 3.75 2021    PSASCREEN 3.21 2018    PSASCREEN 3.54 2017     Social History     Tobacco Use    Smoking status: Former     Packs/day: 1.00     Years: 10.00     Additional pack years: 0.00     Total pack years: 10.00     Types: Cigarettes     Quit date: 10/10/1999     Years since quittin.4    Smokeless tobacco: Never   Vaping Use    Vaping Use: Never used   Substance Use Topics    Alcohol use: Yes     Alcohol/week: 7.0 standard drinks of alcohol     Types: 7 Cans of beer per week    Drug use: Never   Works as a nurse, [employer].     Has family hx of prostate cancer on his father. Treated.  from multiple myeloma.     Review of Systems  A complete review of systems was performed. All systems are noted to be negative unless indicated in the history of present illness, impression, active problem list, or past histories.     Objective   Physical Exam  General: Well developed, well nourished, alert and cooperative, appears in no acute distress  Eyes: Non-injected conjunctiva, sclera clear, no proptosis  Cardiac: Extremities are warm and well perfused. No edema, cyanosis or pallor.   Lungs: Breathing is easy, non-labored. Speaking in clear and complete sentences. Normal diaphragmatic movement.  Abdomen: soft, non-tender  MSK: Ambulatory with steady gait, unassisted  Neuro: alert and oriented to person, place and time  Psych: Demonstrates good judgement and reason, without hallucinations, abnormal affect or abnormal behaviors.  Skin: no obvious lesions, no rashes.  : phallus  circumcised, normal in size, no plaques or lesions. Testicles normal in size and texture, no masses  MARTIR: prostate enlarged, smooth surface, no nodules    Assessment/Plan   69 y.o. male who presents for elevated PSA. He had previously seen a provider but no investigation done. Family hx of prostate cancer.     No urinary sxs. He has ED, well managed with Viagra.    I personally reviewed the medical records of the patient including the note of the referring physician.     I reviewed with the patient the value and significance of a rising PSA, and the role in screening and early detection of prostate cancer. I explained that PSA is prostate specific, yet not prostate cancer specific, and typical etiology for the rise of PSA include UTI, prostate enlargement, prostatic inflammation such as a bacterial prostatitis, urinary retention, and prostate cancer. To narrow our differential diagnosis, we rule out urinary infection and retention, and perform a prostate MRI which will give us more information about the size of the prostate, possible inflammation, and suspicious lesions which will serve as targets for MRI/Ultrasound guided fusion targeted biopsy of the prostate.  Positive MRI will necessitate a biopsy of the indicated lesions, whereas a negative MRI will be interpreted in the context of the clinical suspicion, which includes the PSA velocity (speed of rise of PSA), PSA density, age of the patient, and positive family history for prostate cancer or even breast cancer.  I explained to him that prostate cancer is the most common malignancy in men, however it is not the most common cancer killer for several reasons. These are related to the fact that prostate cancer is mostly not aggressive, slow in it growth, progression, and recurrence. In a lot of men, prostate cancer is not diagnosed and still does not result in cancer-related death, and as such active surveillance has become an accepted management option in  low-grade low-stage low-volume prostate cancer. A proportion of men with prostate cancer still require treatment in order to prevent progression and metastasis, and some men might require a multidisciplinary management including different combinations of surgery, radiation, and systemic therapy. In order to better diagnose and decide on treatment options in prostate cancer, proper diagnosis should be performed and shared decision making between the physician and the patient is key at that point.    The patient understands the overall situation, and is willing to proceed with the plan starting with urine testing followed by MRI of the prostate. We discussed it is possible that a biopsy of the prostate would be the next step due to family hx.     Plan:  Urinalysis and urine culture  MRI of the prostate  Continue viagra for ED  FUV 1 month.     Diagnoses and all orders for this visit:  Erectile dysfunction, unspecified erectile dysfunction type  Elevated PSA  -     MR prostate with saji boundaries; Future      Scribe Attestation  By signing my name below, IIndy, Scribaleksey attest that this documentation has been prepared under the direction and in the presence of Aden Parikh MD.

## 2024-04-03 NOTE — LETTER
April 3, 2024     Booker Ford MD  [address]  [address]  [facility] [address]    Patient: Nelson Kulkarni   YOB: 1955   Date of Visit: 4/3/2024       Dear Dr. Booker Ford MD:    Thank you for referring Nelson Kulkarni to me for evaluation. Below are my notes for this consultation.  If you have questions, please do not hesitate to call me. I look forward to following your patient along with you.       Sincerely,     Aden Parikh MD      CC: No Recipients  ______________________________________________________________________________________    Subjective  Patient ID: Nelson Kulkarni is a 69 y.o. male who presents for elevated PSA.  HPI  69 y.o. male who presents for elevated PSA. He had previously seen a provider but no investigation done.     No urinary sxs. He has ED, well managed with Viagra.     Lab Results   Component Value Date    PSA 6.57 (H) 2024    PSA 4.99 (H) 2023    PSASCREEN 4.22 (H) 2022    PSASCREEN 6.32 (H) 2022    PSASCREEN 3.75 2021    PSASCREEN 3.21 2018    PSASCREEN 3.54 2017     Social History     Tobacco Use   • Smoking status: Former     Packs/day: 1.00     Years: 10.00     Additional pack years: 0.00     Total pack years: 10.00     Types: Cigarettes     Quit date: 10/10/1999     Years since quittin.4   • Smokeless tobacco: Never   Vaping Use   • Vaping Use: Never used   Substance Use Topics   • Alcohol use: Yes     Alcohol/week: 7.0 standard drinks of alcohol     Types: 7 Cans of beer per week   • Drug use: Never   Works as a nurse, [employer].     Has family hx of prostate cancer on his father. Treated.  from multiple myeloma.     Review of Systems  A complete review of systems was performed. All systems are noted to be negative unless indicated in the history of present illness, impression, active problem list, or past histories.     Objective  Physical Exam  General: Well developed, well nourished, alert and cooperative,  appears in no acute distress  Eyes: Non-injected conjunctiva, sclera clear, no proptosis  Cardiac: Extremities are warm and well perfused. No edema, cyanosis or pallor.   Lungs: Breathing is easy, non-labored. Speaking in clear and complete sentences. Normal diaphragmatic movement.  Abdomen: soft, non-tender  MSK: Ambulatory with steady gait, unassisted  Neuro: alert and oriented to person, place and time  Psych: Demonstrates good judgement and reason, without hallucinations, abnormal affect or abnormal behaviors.  Skin: no obvious lesions, no rashes.  : phallus circumcised, normal in size, no plaques or lesions. Testicles normal in size and texture, no masses  MARTIR: prostate enlarged, smooth surface, no nodules    Assessment/Plan  69 y.o. male who presents for elevated PSA. He had previously seen a provider but no investigation done. Family hx of prostate cancer.     No urinary sxs. He has ED, well managed with Viagra.    I personally reviewed the medical records of the patient including the note of the referring physician.     I reviewed with the patient the value and significance of a rising PSA, and the role in screening and early detection of prostate cancer. I explained that PSA is prostate specific, yet not prostate cancer specific, and typical etiology for the rise of PSA include UTI, prostate enlargement, prostatic inflammation such as a bacterial prostatitis, urinary retention, and prostate cancer. To narrow our differential diagnosis, we rule out urinary infection and retention, and perform a prostate MRI which will give us more information about the size of the prostate, possible inflammation, and suspicious lesions which will serve as targets for MRI/Ultrasound guided fusion targeted biopsy of the prostate.  Positive MRI will necessitate a biopsy of the indicated lesions, whereas a negative MRI will be interpreted in the context of the clinical suspicion, which includes the PSA velocity (speed of rise  of PSA), PSA density, age of the patient, and positive family history for prostate cancer or even breast cancer.  I explained to him that prostate cancer is the most common malignancy in men, however it is not the most common cancer killer for several reasons. These are related to the fact that prostate cancer is mostly not aggressive, slow in it growth, progression, and recurrence. In a lot of men, prostate cancer is not diagnosed and still does not result in cancer-related death, and as such active surveillance has become an accepted management option in low-grade low-stage low-volume prostate cancer. A proportion of men with prostate cancer still require treatment in order to prevent progression and metastasis, and some men might require a multidisciplinary management including different combinations of surgery, radiation, and systemic therapy. In order to better diagnose and decide on treatment options in prostate cancer, proper diagnosis should be performed and shared decision making between the physician and the patient is key at that point.    The patient understands the overall situation, and is willing to proceed with the plan starting with urine testing followed by MRI of the prostate. We discussed it is possible that a biopsy of the prostate would be the next step due to family hx.     Plan:  Urinalysis and urine culture  MRI of the prostate  Continue viagra for ED  FUV 1 month.     Diagnoses and all orders for this visit:  Erectile dysfunction, unspecified erectile dysfunction type  Elevated PSA  -     MR prostate with saji boundaries; Future      Scribe Attestation  By signing my name below, IIndy, Scrkurt attest that this documentation has been prepared under the direction and in the presence of Aden Parikh MD.

## 2024-04-05 DIAGNOSIS — M51.26 LUMBAR HERNIATED DISC: ICD-10-CM

## 2024-04-08 RX ORDER — GABAPENTIN 300 MG/1
300 CAPSULE ORAL DAILY
Qty: 90 CAPSULE | Refills: 0 | Status: SHIPPED | OUTPATIENT
Start: 2024-04-08

## 2024-04-10 ENCOUNTER — APPOINTMENT (OUTPATIENT)
Dept: UROLOGY | Facility: CLINIC | Age: 69
End: 2024-04-10
Payer: COMMERCIAL

## 2024-04-23 ENCOUNTER — APPOINTMENT (OUTPATIENT)
Dept: ORTHOPEDIC SURGERY | Facility: CLINIC | Age: 69
End: 2024-04-23
Payer: COMMERCIAL

## 2024-04-25 ENCOUNTER — APPOINTMENT (OUTPATIENT)
Dept: RADIOLOGY | Facility: HOSPITAL | Age: 69
End: 2024-04-25
Payer: COMMERCIAL

## 2024-05-08 ENCOUNTER — APPOINTMENT (OUTPATIENT)
Dept: UROLOGY | Facility: CLINIC | Age: 69
End: 2024-05-08
Payer: COMMERCIAL

## 2024-05-13 ENCOUNTER — HOSPITAL ENCOUNTER (OUTPATIENT)
Dept: RADIOLOGY | Facility: HOSPITAL | Age: 69
Discharge: HOME | End: 2024-05-13
Payer: COMMERCIAL

## 2024-05-13 DIAGNOSIS — R97.20 ELEVATED PSA: ICD-10-CM

## 2024-05-13 PROCEDURE — 72197 MRI PELVIS W/O & W/DYE: CPT | Performed by: RADIOLOGY

## 2024-05-13 PROCEDURE — 72197 MRI PELVIS W/O & W/DYE: CPT

## 2024-05-13 PROCEDURE — A9575 INJ GADOTERATE MEGLUMI 0.1ML: HCPCS | Performed by: STUDENT IN AN ORGANIZED HEALTH CARE EDUCATION/TRAINING PROGRAM

## 2024-05-13 PROCEDURE — 2550000001 HC RX 255 CONTRASTS: Performed by: STUDENT IN AN ORGANIZED HEALTH CARE EDUCATION/TRAINING PROGRAM

## 2024-05-13 RX ORDER — GADOTERATE MEGLUMINE 376.9 MG/ML
17 INJECTION INTRAVENOUS
Status: COMPLETED | OUTPATIENT
Start: 2024-05-13 | End: 2024-05-13

## 2024-05-13 RX ADMIN — GADOTERATE MEGLUMINE 17 ML: 376.9 INJECTION INTRAVENOUS at 13:30

## 2024-06-17 ENCOUNTER — HOSPITAL ENCOUNTER (EMERGENCY)
Facility: HOSPITAL | Age: 69
Discharge: HOME | End: 2024-06-17
Attending: EMERGENCY MEDICINE
Payer: COMMERCIAL

## 2024-06-17 VITALS
HEIGHT: 71 IN | BODY MASS INDEX: 26.88 KG/M2 | RESPIRATION RATE: 18 BRPM | OXYGEN SATURATION: 100 % | HEART RATE: 74 BPM | TEMPERATURE: 97.5 F | WEIGHT: 192 LBS | SYSTOLIC BLOOD PRESSURE: 178 MMHG | DIASTOLIC BLOOD PRESSURE: 80 MMHG

## 2024-06-17 DIAGNOSIS — T78.40XA ALLERGIC REACTION, INITIAL ENCOUNTER: ICD-10-CM

## 2024-06-17 DIAGNOSIS — F41.1 GENERALIZED ANXIETY DISORDER: ICD-10-CM

## 2024-06-17 DIAGNOSIS — L50.9 URTICARIA: ICD-10-CM
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
## 2024-06-17 DIAGNOSIS — S60.562A INSECT BITE OF LEFT HAND, INITIAL ENCOUNTER: Primary | ICD-10-CM

## 2024-06-17 LAB
ALBUMIN SERPL BCP-MCNC: 4.2 G/DL (ref 3.4–5)
ALP SERPL-CCNC: 50 U/L (ref 33–136)
ALT SERPL W P-5'-P-CCNC: 25 U/L (ref 10–52)
ANION GAP SERPL CALC-SCNC: 16 MMOL/L (ref 10–20)
AST SERPL W P-5'-P-CCNC: 25 U/L (ref 9–39)
BASOPHILS # BLD AUTO: 0.05 X10*3/UL (ref 0–0.1)
BASOPHILS NFR BLD AUTO: 0.7 %
BILIRUB SERPL-MCNC: 0.8 MG/DL (ref 0–1.2)
BUN SERPL-MCNC: 18 MG/DL (ref 6–23)
CALCIUM SERPL-MCNC: 9.4 MG/DL (ref 8.6–10.3)
CHLORIDE SERPL-SCNC: 105 MMOL/L (ref 98–107)
CO2 SERPL-SCNC: 20 MMOL/L (ref 21–32)
CREAT SERPL-MCNC: 1.01 MG/DL (ref 0.5–1.3)
EGFRCR SERPLBLD CKD-EPI 2021: 82 ML/MIN/1.73M*2
EOSINOPHIL # BLD AUTO: 0.18 X10*3/UL (ref 0–0.7)
EOSINOPHIL NFR BLD AUTO: 2.6 %
ERYTHROCYTE [DISTWIDTH] IN BLOOD BY AUTOMATED COUNT: 12.2 % (ref 11.5–14.5)
GLUCOSE SERPL-MCNC: 106 MG/DL (ref 74–99)
HCT VFR BLD AUTO: 40.4 % (ref 41–52)
HGB BLD-MCNC: 14.9 G/DL (ref 13.5–17.5)
IMM GRANULOCYTES # BLD AUTO: 0.02 X10*3/UL (ref 0–0.7)
IMM GRANULOCYTES NFR BLD AUTO: 0.3 % (ref 0–0.9)
LYMPHOCYTES # BLD AUTO: 2.72 X10*3/UL (ref 1.2–4.8)
LYMPHOCYTES NFR BLD AUTO: 40 %
MCH RBC QN AUTO: 34.9 PG (ref 26–34)
MCHC RBC AUTO-ENTMCNC: 36.9 G/DL (ref 32–36)
MCV RBC AUTO: 95 FL (ref 80–100)
MONOCYTES # BLD AUTO: 0.65 X10*3/UL (ref 0.1–1)
MONOCYTES NFR BLD AUTO: 9.6 %
NEUTROPHILS # BLD AUTO: 3.18 X10*3/UL (ref 1.2–7.7)
NEUTROPHILS NFR BLD AUTO: 46.8 %
NRBC BLD-RTO: 0 /100 WBCS (ref 0–0)
PLATELET # BLD AUTO: 249 X10*3/UL (ref 150–450)
POTASSIUM SERPL-SCNC: 3.6 MMOL/L (ref 3.5–5.3)
PROT SERPL-MCNC: 6.8 G/DL (ref 6.4–8.2)
RBC # BLD AUTO: 4.27 X10*6/UL (ref 4.5–5.9)
SODIUM SERPL-SCNC: 137 MMOL/L (ref 136–145)
WBC # BLD AUTO: 6.8 X10*3/UL (ref 4.4–11.3)

## 2024-06-17 PROCEDURE — 99284 EMERGENCY DEPT VISIT MOD MDM: CPT

## 2024-06-17 PROCEDURE — 84075 ASSAY ALKALINE PHOSPHATASE: CPT | Performed by: STUDENT IN AN ORGANIZED HEALTH CARE EDUCATION/TRAINING PROGRAM

## 2024-06-17 PROCEDURE — 85025 COMPLETE CBC W/AUTO DIFF WBC: CPT | Performed by: STUDENT IN AN ORGANIZED HEALTH CARE EDUCATION/TRAINING PROGRAM

## 2024-06-17 PROCEDURE — 2500000004 HC RX 250 GENERAL PHARMACY W/ HCPCS (ALT 636 FOR OP/ED): Performed by: STUDENT IN AN ORGANIZED HEALTH CARE EDUCATION/TRAINING PROGRAM

## 2024-06-17 PROCEDURE — 36415 COLL VENOUS BLD VENIPUNCTURE: CPT | Performed by: STUDENT IN AN ORGANIZED HEALTH CARE EDUCATION/TRAINING PROGRAM

## 2024-06-17 PROCEDURE — 96375 TX/PRO/DX INJ NEW DRUG ADDON: CPT

## 2024-06-17 PROCEDURE — 96374 THER/PROPH/DIAG INJ IV PUSH: CPT

## 2024-06-17 RX ORDER — FAMOTIDINE 20 MG/1
20 TABLET, FILM COATED ORAL 2 TIMES DAILY
Qty: 8 TABLET | Refills: 0 | Status: SHIPPED | OUTPATIENT
Start: 2024-06-17 | End: 2024-06-21

## 2024-06-17 RX ORDER — PREDNISONE 20 MG/1
40 TABLET ORAL DAILY
Qty: 8 TABLET | Refills: 0 | Status: SHIPPED | OUTPATIENT
Start: 2024-06-17 | End: 2024-06-21

## 2024-06-17 RX ORDER — FAMOTIDINE 10 MG/ML
20 INJECTION INTRAVENOUS ONCE
Status: COMPLETED | OUTPATIENT
Start: 2024-06-17 | End: 2024-06-17

## 2024-06-17 RX ORDER — ESCITALOPRAM OXALATE 10 MG/1
10 TABLET ORAL DAILY
Qty: 90 TABLET | Refills: 3 | Status: SHIPPED | OUTPATIENT
Start: 2024-06-17

## 2024-06-17 ASSESSMENT — LIFESTYLE VARIABLES
EVER HAD A DRINK FIRST THING IN THE MORNING TO STEADY YOUR NERVES TO GET RID OF A HANGOVER: NO
HAVE YOU EVER FELT YOU SHOULD CUT DOWN ON YOUR DRINKING: NO
HAVE PEOPLE ANNOYED YOU BY CRITICIZING YOUR DRINKING: NO
TOTAL SCORE: 0
EVER FELT BAD OR GUILTY ABOUT YOUR DRINKING: NO

## 2024-06-17 ASSESSMENT — PAIN - FUNCTIONAL ASSESSMENT: PAIN_FUNCTIONAL_ASSESSMENT: 0-10

## 2024-06-17 NOTE — DISCHARGE INSTRUCTIONS
Take prednisone and pepcid as prescribed along with benadryl as needed for itching.  Use your epi-pen immediately and return to the emergency department if you  have swelling of your face or throat, trouble breathing, or for any other acute concerns.

## 2024-06-17 NOTE — ED PROVIDER NOTES
CC: Insect Bite (S/P bee sting to right wrist approx 15 minutes ago.)     HPI:  69-year-old male with history of hypertension, prior anaphylaxis to bee stings, presents to the emergency department following a bee sting about 15 minutes prior to arrival.  Patient was stung at least once to his left wrist.  Complaining of diffuse pruritus and urticaria.  No trouble breathing, does report feeling anxious.  Took 50 mg PO benadryl just prior to arrival.    No throat swelling or oropharyngeal pruritus, slight abdominal discomfort but no nausea or vomiting.    Records Reviewed:  Recent available ED and inpatient notes reviewed in EMR.    PMHx/PSHx:  Per HPI.   - has Generalized anxiety disorder; Abnormal posture; Benign essential hypertension; Cervical arthritis with myelopathy; Combined hyperlipidemia; Insomnia due to medical condition; Low back pain; Lumbar radiculopathy; Lumbar herniated disc; Malaise and fatigue; Sciatica of left side; and Postherpetic neuralgia on their problem list.  - has a past surgical history that includes Circumcision, primary and Fracture surgery.    Medications:  Reviewed in EMR. See EMR for complete list of medications and doses.    Allergies:  Bee venom protein (honey bee) and Diclofenac sodium    Social History:  - Tobacco:  reports that he quit smoking about 24 years ago. His smoking use included cigarettes. He started smoking about 34 years ago. He has a 10 pack-year smoking history. He has never used smokeless tobacco.   - Alcohol:  reports current alcohol use of about 7.0 standard drinks of alcohol per week.   - Illicit Drugs:  reports no history of drug use.     ROS:  Per HPI.       ???????????????????????????????????????????????????????????????  Triage Vitals:  T 36.4 °C (97.5 °F)  HR (!) 112  BP (!) 193/99  RR 18  O2 100 % None (Room air)    Physical Exam  Vitals and nursing note reviewed.   Constitutional:       General: He is not in acute distress.     Appearance: Normal  appearance.   HENT:      Mouth/Throat:      Comments: No oropharyngeal edema, no oral mucosal lesions, no pooling of secretions  Neck:      Comments: Normal voice, no stridor  Cardiovascular:      Rate and Rhythm: Regular rhythm. Tachycardia present.      Heart sounds: Normal heart sounds.   Pulmonary:      Breath sounds: Normal breath sounds. No wheezing or rales.   Abdominal:      Tenderness: There is no abdominal tenderness.   Skin:     Comments: Scattered areas of erythema and urticaria most prominent over chest.  Area of swelling to left wrist.   Neurological:      Mental Status: He is alert.   Psychiatric:         Behavior: Behavior normal.       ???????????????????????????????????????????????????????????????  Assessment and Plan:  69-year-old male with history of hypertension and prior anaphylaxis to bee stings presents to the emergency department following a bee sting.  He is describing diffuse pruritus, has erythema and urticarial rash on exam, no increased work of breathing and no evidence for pharyngeal involvement at this time.  Patient received p.o. Benadryl just prior to arrival, will give additional IV Solu-Medrol and Pepcid and observe for signs of multiorgan involvement, with low threshold for IM epinephrine.    ED Course:  Feeling much better on repeat evaluation after treatments and 2 hours of observation.  No respiratory symptoms or oropharyngeal involvement, no abdominal discomfort, nausea, or vomiting.  Was provided prescription for prednisone and Pepcid to take at home along with Benadryl.  Has 2 up-to-date EpiPen's with him and is knowledgeable about their use.  Will be discharged home in stable condition.    Social Determinants Limiting Care:  None identified    Disposition:  Discharge home    --  Bartolo Burleson MD  Emergency Medicine, PGY-3         Procedures ? [employer] last updated 6/17/2024 12:15 PM         Bartolo Burleson MD  Resident  06/17/24 [id number]

## 2024-06-17 NOTE — PROGRESS NOTES
The patient was seen by the midlevel/resident.  I have personally saw the patient and made/approved the management plan and take responsibility for the patient management.  I reviewed the EKG's (when done) and agree with the interpretation.  I have seen and examined the patient; agree with the workup, evaluation, MDM, and diagnosis.  The care plan has been discussed with the midlevel/resident; I have reviewed the note and agree with the documented findings.     Patient still has slight erythema but is feeling better.  He was observed for 2 hours.  He is stable at this time does not require epi.  Plan is to discharge home he has a normal airway and can follow-up.  I talked him about the need to get someone else to take care of the bees that are near his deck.  Patient will be discharged.  Diagnoses as of 06/17/24 1407   Insect bite of left hand, initial encounter   Allergic reaction, initial encounter   Urticaria     Edmundo Bull MD

## 2024-07-01 DIAGNOSIS — I10 BENIGN ESSENTIAL HYPERTENSION: ICD-10-CM

## 2024-07-01 DIAGNOSIS — M54.16 LUMBAR RADICULOPATHY: ICD-10-CM

## 2024-07-01 RX ORDER — AMLODIPINE BESYLATE 10 MG/1
TABLET ORAL
Qty: 90 TABLET | Refills: 1 | Status: SHIPPED | OUTPATIENT
Start: 2024-07-01

## 2024-07-01 RX ORDER — MELOXICAM 15 MG/1
TABLET ORAL
Qty: 90 TABLET | Refills: 0 | Status: SHIPPED | OUTPATIENT
Start: 2024-07-01

## 2024-07-28 DIAGNOSIS — M51.26 LUMBAR HERNIATED DISC: ICD-10-CM

## 2024-08-05 ENCOUNTER — APPOINTMENT (OUTPATIENT)
Dept: UROLOGY | Facility: CLINIC | Age: 69
End: 2024-08-05
Payer: COMMERCIAL

## 2024-08-05 DIAGNOSIS — R97.20 ELEVATED PSA: Primary | ICD-10-CM

## 2024-08-05 DIAGNOSIS — N52.9 ERECTILE DYSFUNCTION, UNSPECIFIED ERECTILE DYSFUNCTION TYPE: ICD-10-CM

## 2024-08-05 DIAGNOSIS — N40.0 BENIGN PROSTATIC HYPERPLASIA WITHOUT LOWER URINARY TRACT SYMPTOMS: ICD-10-CM

## 2024-08-05 PROCEDURE — 99213 OFFICE O/P EST LOW 20 MIN: CPT | Performed by: STUDENT IN AN ORGANIZED HEALTH CARE EDUCATION/TRAINING PROGRAM

## 2024-08-05 PROCEDURE — G2211 COMPLEX E/M VISIT ADD ON: HCPCS | Performed by: STUDENT IN AN ORGANIZED HEALTH CARE EDUCATION/TRAINING PROGRAM

## 2024-08-05 NOTE — PROGRESS NOTES
Subjective   Patient ID: Nelson Kulkarni is a 69 y.o. male.    HPI  69 y.o. male who presents for elevated PSA. He had previously seen a provider but no investigation done. Family hx of prostate cancer. Presents to review MRI.      No urinary sxs. He has ED, well managed with Viagra.            Lab Results   Component Value Date     PSA 6.57 (H) 01/19/2024     PSA 4.99 (H) 02/21/2023     PSASCREEN 4.22 (H) 12/08/2022     PSASCREEN 6.32 (H) 11/08/2022     PSASCREEN 3.75 07/01/2021     PSASCREEN 3.21 12/11/2018     PSASCREEN 3.54 12/05/2017      MRI prostate 5/13/2024:  IMPRESSION:  BPH changes of the transition zone. Diffuse non nodular  hypointensities within the peripheral zone, without evidence of  focally restricted diffusion ( PI-RADS 2).  Review of Systems    Objective   Physical Exam    Assessment/Plan   69 y.o. male who presents for elevated PSA. He had previously seen a provider but no investigation done. Family hx of prostate cancer. Presents to review MRI.      No urinary sxs. He has ED, well managed with Viagra.     I personally reviewed the MRI images. BPH changes of the transition zone. Diffuse non nodular  hypointensities within the peripheral zone, without evidence of focally restricted diffusion ( PI-RADS 2).    We will repeat PSA and decide accordingly, if PSA is higher then a biopsy might be recommended recommended. He agrees with plan.     Plan:  PSA now.   Will follow up to review PSA and further plan.     Diagnoses and all orders for this visit:  Elevated PSA  -     Prostate Specific Antigen; Future    Scribe Attestation  By signing my name below, I, Indy Chang, Scrkurt attest that this documentation has been prepared under the direction and in the presence of Aden Parikh MD.

## 2024-08-19 ENCOUNTER — LAB (OUTPATIENT)
Dept: LAB | Facility: LAB | Age: 69
End: 2024-08-19
Payer: COMMERCIAL

## 2024-08-19 DIAGNOSIS — R97.20 ELEVATED PSA: ICD-10-CM

## 2024-08-19 LAB — PSA SERPL-MCNC: 5.92 NG/ML

## 2024-08-19 PROCEDURE — 84153 ASSAY OF PSA TOTAL: CPT

## 2024-08-19 PROCEDURE — 36415 COLL VENOUS BLD VENIPUNCTURE: CPT

## 2024-08-26 ENCOUNTER — APPOINTMENT (OUTPATIENT)
Dept: UROLOGY | Facility: CLINIC | Age: 69
End: 2024-08-26
Payer: COMMERCIAL

## 2024-08-26 DIAGNOSIS — R97.20 ELEVATED PSA: Primary | ICD-10-CM

## 2024-08-26 PROCEDURE — 99214 OFFICE O/P EST MOD 30 MIN: CPT | Performed by: STUDENT IN AN ORGANIZED HEALTH CARE EDUCATION/TRAINING PROGRAM

## 2024-08-26 NOTE — PROGRESS NOTES
Subjective   Patient ID: Nelson Kulkarni is a 69 y.o. male.    Virtual or Telephone Consent    A telephone visit (audio only) between the patient (at the originating site) and the provider (at the distant site) was utilized to provide this telehealth service.   Verbal consent was requested and obtained from Nelson Kulkarni on this date, 08/26/24 for a telehealth visit.       HPI  69 y.o. male who presents for FUV elevated PSA. He had previously seen a provider but no investigation done. Family hx of prostate cancer. Recent PSA 5.9. Most recent MRI showed BPH changes. PI-RADS 2.      No urinary sxs. He has ED, well managed with Viagra.     Lab Results   Component Value Date    PSA 5.92 (H) 08/19/2024    PSA 6.57 (H) 01/19/2024    PSA 4.99 (H) 02/21/2023     MRI prostate 5/13/2024:  IMPRESSION:  BPH changes of the transition zone. Diffuse non nodular  hypointensities within the peripheral zone, without evidence of  focally restricted diffusion ( PI-RADS 2).    Father with prostate cancer.     Review of Systems    Objective   Physical Exam    Assessment/Plan   69 y.o. male who presents for FUV elevated PSA. He had previously seen a provider but no investigation done. Family hx of prostate cancer. Recent PSA 5.9. Most recent MRI showed BPH changes. PI-RADS 2.      No urinary sxs. He has ED, well managed with Viagra.     I had a discussion with the patient regarding his rising PSA and the setting of a negative MRI. I reviewed with him that while I reviewed the MRI he has a negative predictive value ranging in the literature between 80% and 95%, however this is mostly for clinically significant prostate cancers, and a negative MRI does not rule out low-grade cancers. Because of the trend of the PSA upwards, and the lack of explanation of this high PSA with other findings on the MRI such as inflammatory changes or size/density, then I advised for systemic TRUS biopsy in clinic. I reviewed the risks and benefits of the biopsy,  including hematuria, and hematochezia for few days, pelvic soreness/pain for a few days, the risks of exacerbation of urinary symptoms up to the level of complete urinary tension necessitating any changes in the patient's health, fever, or chills, generalized fatigue, then he needs to seek immediate medical attention to start antibiotics and prevent exacerbation of his situation.    Patient understands his situation and agreed to proceed with systemic biopsy.    Plan:  Will proceed with Systematic TRUS prostate biopsy in clinic     Diagnoses and all orders for this visit:  Elevated PSA    Scribe Attestation  By signing my name below, IIndy Scribaleksey attest that this documentation has been prepared under the direction and in the presence of Aden Parikh MD.

## 2024-08-29 DIAGNOSIS — Z79.2 PROPHYLACTIC ANTIBIOTIC: ICD-10-CM

## 2024-08-30 RX ORDER — CIPROFLOXACIN 500 MG/1
TABLET ORAL
Qty: 3 TABLET | Refills: 0 | Status: SHIPPED | OUTPATIENT
Start: 2024-08-30

## 2024-09-09 DIAGNOSIS — I10 BENIGN ESSENTIAL HYPERTENSION: ICD-10-CM

## 2024-09-24 RX ORDER — LOSARTAN POTASSIUM AND HYDROCHLOROTHIAZIDE 12.5; 1 MG/1; MG/1
1 TABLET ORAL DAILY
Qty: 90 TABLET | Refills: 0 | Status: SHIPPED | OUTPATIENT
Start: 2024-09-24

## 2024-09-24 RX ORDER — GABAPENTIN 300 MG/1
300 CAPSULE ORAL DAILY
Qty: 90 CAPSULE | Refills: 0 | Status: SHIPPED | OUTPATIENT
Start: 2024-09-24

## 2024-09-30 DIAGNOSIS — M54.16 LUMBAR RADICULOPATHY: ICD-10-CM

## 2024-09-30 RX ORDER — MELOXICAM 15 MG/1
TABLET ORAL
Qty: 90 TABLET | Refills: 0 | Status: SHIPPED | OUTPATIENT
Start: 2024-09-30

## 2024-10-02 ENCOUNTER — APPOINTMENT (OUTPATIENT)
Dept: UROLOGY | Facility: CLINIC | Age: 69
End: 2024-10-02
Payer: COMMERCIAL

## 2024-10-02 DIAGNOSIS — R97.20 ELEVATED PSA: ICD-10-CM

## 2024-10-02 DIAGNOSIS — Z79.2 PROPHYLACTIC ANTIBIOTIC: ICD-10-CM

## 2024-10-02 LAB
POC APPEARANCE, URINE: CLEAR
POC BILIRUBIN, URINE: NEGATIVE
POC BLOOD, URINE: NEGATIVE
POC COLOR, URINE: YELLOW
POC GLUCOSE, URINE: NEGATIVE MG/DL
POC KETONES, URINE: NEGATIVE MG/DL
POC LEUKOCYTES, URINE: NEGATIVE
POC NITRITE,URINE: NEGATIVE
POC PH, URINE: 6.5 PH
POC PROTEIN, URINE: NEGATIVE MG/DL
POC SPECIFIC GRAVITY, URINE: >=1.03
POC UROBILINOGEN, URINE: 1 EU/DL

## 2024-10-02 PROCEDURE — 96372 THER/PROPH/DIAG INJ SC/IM: CPT | Performed by: STUDENT IN AN ORGANIZED HEALTH CARE EDUCATION/TRAINING PROGRAM

## 2024-10-02 PROCEDURE — 55700 PR PROSTATE NEEDLE BIOPSY ANY APPROACH: CPT | Performed by: STUDENT IN AN ORGANIZED HEALTH CARE EDUCATION/TRAINING PROGRAM

## 2024-10-02 PROCEDURE — 76872 US TRANSRECTAL: CPT | Performed by: STUDENT IN AN ORGANIZED HEALTH CARE EDUCATION/TRAINING PROGRAM

## 2024-10-02 PROCEDURE — 76942 ECHO GUIDE FOR BIOPSY: CPT | Performed by: STUDENT IN AN ORGANIZED HEALTH CARE EDUCATION/TRAINING PROGRAM

## 2024-10-02 RX ORDER — AMIKACIN SULFATE 250 MG/ML
11.5 INJECTION, SOLUTION INTRAMUSCULAR; INTRAVENOUS ONCE
Status: COMPLETED | OUTPATIENT
Start: 2024-10-02 | End: 2024-10-02

## 2024-10-02 NOTE — PROGRESS NOTES
Patient ID: Nelson Kulkarni is a 69 y.o. male.    Procedures  1. TRANSRECTAL ULTRASOUND OF PROSTATE    2. ULTRASOUND GUIDED FUSION FOR BIOPSY  3. TARGETED FUSION BIOPSY OF PROSTATE    Patient was consent and antibiotics were administered on-call to the OR.  In the operating room, patient in lateral position, the transrectal ultrasound probe was inserted, and mapping of the prostate was performed.  Local anesthetic was administered in Denonvillier's fascia.  12 systematic biopsies were obtained.  Patient tolerated procedure well, there was minimal bleeding, and he was transferred to PACU in stable condition.      Subjective   HPI  69 y.o. male who presents for FUV elevated PSA. He had previously seen a provider but no investigation done. Family hx of prostate cancer. Recent PSA 5.9. Most recent MRI showed BPH changes. PI-RADS 2.      No urinary sxs. He has ED, well managed with Viagra.     Lab Results   Component Value Date    PSA 5.92 (H) 08/19/2024    PSA 6.57 (H) 01/19/2024    PSA 4.99 (H) 02/21/2023     MRI prostate 5/13/2024:  IMPRESSION:  BPH changes of the transition zone. Diffuse non nodular  hypointensities within the peripheral zone, without evidence of  focally restricted diffusion ( PI-RADS 2).    Father with prostate cancer.     Assessment/Plan   69 y.o. male who presents for FUV elevated PSA. He had previously seen a provider but no investigation done. Family hx of prostate cancer. Recent PSA 5.9. Most recent MRI showed BPH changes. PI-RADS 2.      No urinary sxs. He has ED, well managed with Viagra.     Plan:  FU in 2 weeks to review TRUS biopsy results    Diagnoses and all orders for this visit:  Elevated PSA

## 2024-10-05 ENCOUNTER — HOSPITAL ENCOUNTER (EMERGENCY)
Facility: HOSPITAL | Age: 69
Discharge: HOME | End: 2024-10-05
Payer: COMMERCIAL

## 2024-10-05 VITALS
DIASTOLIC BLOOD PRESSURE: 82 MMHG | TEMPERATURE: 98.6 F | SYSTOLIC BLOOD PRESSURE: 167 MMHG | WEIGHT: 190 LBS | OXYGEN SATURATION: 99 % | RESPIRATION RATE: 17 BRPM | HEART RATE: 73 BPM | BODY MASS INDEX: 26.6 KG/M2 | HEIGHT: 71 IN

## 2024-10-05 DIAGNOSIS — T63.441A BEE STING, ACCIDENTAL OR UNINTENTIONAL, INITIAL ENCOUNTER: Primary | ICD-10-CM

## 2024-10-05 PROCEDURE — 96374 THER/PROPH/DIAG INJ IV PUSH: CPT

## 2024-10-05 PROCEDURE — 99284 EMERGENCY DEPT VISIT MOD MDM: CPT | Mod: 25

## 2024-10-05 PROCEDURE — 2500000004 HC RX 250 GENERAL PHARMACY W/ HCPCS (ALT 636 FOR OP/ED): Performed by: PHYSICIAN ASSISTANT

## 2024-10-05 RX ORDER — PREDNISONE 20 MG/1
40 TABLET ORAL DAILY
Qty: 8 TABLET | Refills: 0 | Status: SHIPPED | OUTPATIENT
Start: 2024-10-05 | End: 2024-10-09

## 2024-10-05 RX ADMIN — METHYLPREDNISOLONE SODIUM SUCCINATE 125 MG: 125 INJECTION, POWDER, FOR SOLUTION INTRAMUSCULAR; INTRAVENOUS at 16:42

## 2024-10-05 ASSESSMENT — PAIN - FUNCTIONAL ASSESSMENT: PAIN_FUNCTIONAL_ASSESSMENT: 0-10

## 2024-10-05 ASSESSMENT — PAIN DESCRIPTION - DESCRIPTORS: DESCRIPTORS: BURNING

## 2024-10-05 ASSESSMENT — PAIN DESCRIPTION - LOCATION: LOCATION: ARM

## 2024-10-05 ASSESSMENT — COLUMBIA-SUICIDE SEVERITY RATING SCALE - C-SSRS
2. HAVE YOU ACTUALLY HAD ANY THOUGHTS OF KILLING YOURSELF?: NO
6. HAVE YOU EVER DONE ANYTHING, STARTED TO DO ANYTHING, OR PREPARED TO DO ANYTHING TO END YOUR LIFE?: NO
1. IN THE PAST MONTH, HAVE YOU WISHED YOU WERE DEAD OR WISHED YOU COULD GO TO SLEEP AND NOT WAKE UP?: NO

## 2024-10-05 ASSESSMENT — PAIN SCALES - GENERAL: PAINLEVEL_OUTOF10: 4

## 2024-10-05 NOTE — ED PROVIDER NOTES
HPI   Chief Complaint   Patient presents with    Insect Bite       This is a 69-year-old male coming in for bee sting.  He reports that he was stung to the left forearm.  He has been stung before in the past and has developed a rash as well as a warm feeling to his body.  He has never developed swelling or anaphylactic type reaction.  After he was stung he had his wife put meat tenderizer on it in an attempt to help remove the stinger.  He did take 50 mg of Benadryl as well as 20 of Pepcid at home.  He denies any nausea or vomiting.  No diarrhea.  No shortness of breath.  Because of the severity of his discomfort before in the past he went to come in to be evaluated.  Patient is otherwise healthy besides a history of hypertension which he does take medications for.      History provided by:  Patient          Patient History   Past Medical History:   Diagnosis Date    Anxiety     Arthritis     Eczema     Hypertension     Shingles 2023     Past Surgical History:   Procedure Laterality Date    CIRCUMCISION, PRIMARY      FRACTURE SURGERY       Family History   Problem Relation Name Age of Onset    Arthritis Mother Rosemarie     Arthritis Father Rosemarie     Diabetes Father Rosemarie     Hypertension Father Rosemarie      Social History     Tobacco Use    Smoking status: Former     Current packs/day: 0.00     Average packs/day: 1 pack/day for 10.0 years (10.0 ttl pk-yrs)     Types: Cigarettes     Start date: 10/10/1989     Quit date: 10/10/1999     Years since quittin.0    Smokeless tobacco: Never   Vaping Use    Vaping status: Never Used   Substance Use Topics    Alcohol use: Yes     Alcohol/week: 7.0 standard drinks of alcohol     Types: 7 Cans of beer per week     Comment: 1-2x/week    Drug use: Never       Physical Exam   ED Triage Vitals [10/05/24 1632]   Temperature Heart Rate Respirations BP   37 °C (98.6 °F) 85 14 (!) 190/98      Pulse Ox Temp src Heart Rate Source Patient Position   100 % -- -- --      BP  Location FiO2 (%)     -- --       Physical Exam  Vitals and nursing note reviewed.   Constitutional:       General: He is not in acute distress.     Appearance: Normal appearance. He is not ill-appearing or toxic-appearing.   HENT:      Head: Normocephalic and atraumatic.   Eyes:      Extraocular Movements: Extraocular movements intact.      Conjunctiva/sclera: Conjunctivae normal.      Pupils: Pupils are equal, round, and reactive to light.   Cardiovascular:      Rate and Rhythm: Normal rate and regular rhythm.      Pulses: Normal pulses.      Heart sounds: Normal heart sounds.   Pulmonary:      Effort: Pulmonary effort is normal. No respiratory distress.      Breath sounds: Normal breath sounds. No wheezing.   Abdominal:      General: Abdomen is flat. There is no distension.   Musculoskeletal:         General: Normal range of motion.      Cervical back: Normal range of motion and neck supple.   Skin:     General: Skin is warm and dry.      Comments: Small bee sting noted to the ulnar side of the left forearm with powdery substance near it.   Neurological:      General: No focal deficit present.      Mental Status: He is alert and oriented to person, place, and time.   Psychiatric:         Mood and Affect: Mood normal.         Behavior: Behavior normal.         Thought Content: Thought content normal.           ED Course & MDM   Diagnoses as of 10/05/24 1736   Bee sting, accidental or unintentional, initial encounter                 No data recorded     Noemí Coma Scale Score: 15 (10/05/24 1635 : Maria Guadalupe Campbell RN)                           Medical Decision Making  Summary:  Medical Decision Making:   Patient presented as described in HPI. Patient case including ROS, PE, and treatment and plan discussed with ED attending if attached as cosigner. Results from labs and or imaging included below if completed. Nelson Kulkarni  is a 69 y.o. coming in for Patient presents with:  Insect Bite  .  Does not have any history  of anaphylactic type reaction.  He did take Benadryl as well as Pepcid at home.  Patient will be given Solu-Medrol 125 IV and monitored. Patient feels much better.  He was monitored for 1 hour after the arrival.  He reports that he does feel much better and prefers to go home.  He will be discharged with prednisone x 4 more days.  Advised continue Benadryl and Pepcid as needed.  Will be discharged with PCP follow-up.      Disposition is completed with shared decision making with the patient or guardian present with the patient. They were advised to follow up with PCP or recommended provider in 2-3 days for another evaluation and exam. I advised the patient to return or go to closest emergency room immediately if symptoms change, get worse, or new symptoms develop prior to follow up. I explained the plan and treatment course. Patient/guardian is in agreement with plan, treatment course, and follow up and state that they will comply.    Labs Reviewed - No data to display   No orders to display                         Tests/Medications/Escalations of Care considered but not given: As in MDM    Patient care discussed with: N/A  Social Determinants affecting care: N/A    Final diagnosis and disposition as documented          Shared decision making was completed and determined that patient will be discharged. I discussed the differential; results and discharge plan with the patient and/or family/friend/caregiver if present.  I emphasized the importance of follow-up with the physician I referred them to in the timeframe recommended.  I explained reasons for the patient to return to the Emergency Department. They agreed that if they feel their condition is worsening or if they have any other concern they should call 911 immediately for further assistance. I gave the patient an opportunity to ask all questions they had and answered all of them accordingly. They understand return precautions and discharge instructions. The  patient and/or family/friend/caregiver expressed understanding verbally and that they would comply.     Disposition: Discharge      This note has been transcribed using voice recognition and may contain grammatical errors, misplaced words, incorrect words, incorrect phrases or other errors.         Procedure  Procedures     Edmundo Phelan PA-C  10/05/24 4904

## 2024-10-14 LAB
LAB AP ASR DISCLAIMER: NORMAL
LABORATORY COMMENT REPORT: NORMAL
PATH REPORT.FINAL DX SPEC: NORMAL
PATH REPORT.GROSS SPEC: NORMAL
PATH REPORT.RELEVANT HX SPEC: NORMAL
PATH REPORT.TOTAL CANCER: NORMAL
RESIDENT REVIEW: NORMAL

## 2024-10-23 ENCOUNTER — APPOINTMENT (OUTPATIENT)
Dept: UROLOGY | Facility: CLINIC | Age: 69
End: 2024-10-23
Payer: COMMERCIAL

## 2024-10-23 DIAGNOSIS — R97.20 ELEVATED PSA: ICD-10-CM

## 2024-10-23 DIAGNOSIS — C61 MALIGNANT NEOPLASM OF PROSTATE (MULTI): Primary | ICD-10-CM

## 2024-10-23 PROCEDURE — 99214 OFFICE O/P EST MOD 30 MIN: CPT | Performed by: STUDENT IN AN ORGANIZED HEALTH CARE EDUCATION/TRAINING PROGRAM

## 2024-10-23 PROCEDURE — G2211 COMPLEX E/M VISIT ADD ON: HCPCS | Performed by: STUDENT IN AN ORGANIZED HEALTH CARE EDUCATION/TRAINING PROGRAM

## 2024-10-23 NOTE — PROGRESS NOTES
Patient ID: Nelson Kulkarni is a 69 y.o. male.  Subjective   HPI  69 y.o. male who presents for FUV elevated PSA. He had previously seen a provider but no investigation done. Family hx of prostate cancer. Recent PSA 5.9. Most recent MRI showed BPH changes. PI-RADS 2.      He recovered well after his procedure. He has no new complaints today.     He returns today to discuss pathology. He is not currently on any medications for his prostate    Surgical pathology collected 10/02/24:  A. Prostate, left apex, biopsy:   -- Benign prostatic tissue     B. Prostate, left mid, biopsy:   -- Prostatic adenocarcinoma, [place] score 3+3=6 (Grade group 1), involving one of two cores and approximately 5% of the specimen     Note: A PIN4 immunohistochemical cocktail stain (p63, [id number], AMACR) supports the above diagnosis. All controls stain appropriately.     C. Prostate, left base, biopsy:   -- Benign prostatic tissue     D. Prostate, right apex, biopsy:   -- Benign prostatic tissue with acute and chronic inflammation     E. Prostate, right mid, biopsy:   -- Benign prostatic tissue     F. Prostate, right base, biopsy:   -- Benign prostatic tissue    Lab Results   Component Value Date    PSA 5.92 (H) 08/19/2024    PSA 6.57 (H) 01/19/2024    PSA 4.99 (H) 02/21/2023     MRI prostate 5/13/2024:  IMPRESSION:  BPH changes of the transition zone. Diffuse non nodular  hypointensities within the peripheral zone, without evidence of  focally restricted diffusion ( PI-RADS 2).    Father with prostate cancer.     Assessment/Plan   69 y.o. male with newly diagnosed Amy score 3+3=6 (Grade group 1) prostate cancer.  Family hx of prostate cancer. Recent PSA 5.9. Most recent MRI showed BPH changes. PI-RADS 2.      I reviewed with the patient the pathology of the biopsy, Amy score 3+3=6 prostate cancer. I reviewed the grades and stages of prostate cancer, and the available options for treatment of prostate cancer, including active  surveillance, radical surgery and radiation therapy. I went over the pros and cons of each line of treatment based on his grade of disease and preferences of short and long term side effects.    Active surveillance is typically reserved for low-risk GG1 prostate cancer as well as very low volume GG2 if patient is comfortable. This will include serial PSA's, physical exam including MARTIR, serial MRI every 12-15 months or if PSA significantly rises, and repeat biopsies if indicated because of any worsening parameter.    For surgery, I explained that cancer would be excised as part of the radical prostatectomy, with or without nerve sparing depending on the anatomical properties of the cancer and the patient preference. The trifecta for prostate cancer surgery includes cancer control, continence after surgery and sexual function. I explained that the recovery of continence after surgery depends on age, smoking status, and comorbidities particularly diabetes. Surgery as robotic-assisted radical prostatectomy will take about 4 hours, with an additional hour for robot docking and wound closures, requires 1 night of hospital stay with a drain, and a standard of 7-8 days of Neal catheter. Recovery and return to full physical activity takes 6 weeks. Urinary incontinence typically recovers within few weeks to months, with 90% achieving continence within 3 to 6 months.    Radiation therapy includes external beam radiation therapy and brachytherapy, and typically requires ADT as a radiosensitizer, either for 6 months for intermediate-risk disease, or 2 years for high-risk disease. I reviewed the side effects of ADT, including hot flushes, fatigue, worsening cardiovascular and skeletal disease. Radiation in turn could result in urinary symptoms because of irritation of the bladder neck and trigone, decline of sexual function both during ADT as well as progressively over time.    Patient verbalized understanding of his options and  elected to go with active surveillance.    Plan:  Active surveillance for prostate cancer  PSA free and total February 2025  FU February 2025     Scribe Attestation  By signing my name below, I, Pati Goel   attest that this documentation has been prepared under the direction and in the presence of Aden Parikh MD.

## 2024-10-29 ASSESSMENT — PROMIS GLOBAL HEALTH SCALE
EMOTIONAL_PROBLEMS: RARELY
RATE_SOCIAL_SATISFACTION: GOOD
RATE_PHYSICAL_HEALTH: GOOD
RATE_MENTAL_HEALTH: GOOD
CARRYOUT_PHYSICAL_ACTIVITIES: COMPLETELY
RATE_QUALITY_OF_LIFE: GOOD
CARRYOUT_SOCIAL_ACTIVITIES: GOOD
RATE_AVERAGE_PAIN: 1
RATE_GENERAL_HEALTH: GOOD

## 2024-10-30 ENCOUNTER — APPOINTMENT (OUTPATIENT)
Dept: PRIMARY CARE | Facility: CLINIC | Age: 69
End: 2024-10-30
Payer: COMMERCIAL

## 2024-10-30 VITALS
BODY MASS INDEX: 27.3 KG/M2 | OXYGEN SATURATION: 95 % | WEIGHT: 195 LBS | SYSTOLIC BLOOD PRESSURE: 138 MMHG | HEIGHT: 71 IN | DIASTOLIC BLOOD PRESSURE: 66 MMHG | HEART RATE: 74 BPM

## 2024-10-30 DIAGNOSIS — Z12.11 ENCOUNTER FOR SCREENING FOR MALIGNANT NEOPLASM OF COLON: ICD-10-CM

## 2024-10-30 DIAGNOSIS — F41.1 GENERALIZED ANXIETY DISORDER: ICD-10-CM

## 2024-10-30 DIAGNOSIS — Z00.00 WELL ADULT EXAM: Primary | ICD-10-CM

## 2024-10-30 DIAGNOSIS — M51.26 LUMBAR HERNIATED DISC: ICD-10-CM

## 2024-10-30 PROCEDURE — 1159F MED LIST DOCD IN RCRD: CPT | Performed by: FAMILY MEDICINE

## 2024-10-30 PROCEDURE — 1158F ADVNC CARE PLAN TLK DOCD: CPT | Performed by: FAMILY MEDICINE

## 2024-10-30 PROCEDURE — 99397 PER PM REEVAL EST PAT 65+ YR: CPT | Performed by: FAMILY MEDICINE

## 2024-10-30 PROCEDURE — 1160F RVW MEDS BY RX/DR IN RCRD: CPT | Performed by: FAMILY MEDICINE

## 2024-10-30 PROCEDURE — 1123F ACP DISCUSS/DSCN MKR DOCD: CPT | Performed by: FAMILY MEDICINE

## 2024-10-30 PROCEDURE — 3075F SYST BP GE 130 - 139MM HG: CPT | Performed by: FAMILY MEDICINE

## 2024-10-30 PROCEDURE — 3008F BODY MASS INDEX DOCD: CPT | Performed by: FAMILY MEDICINE

## 2024-10-30 PROCEDURE — 3078F DIAST BP <80 MM HG: CPT | Performed by: FAMILY MEDICINE

## 2024-10-30 PROCEDURE — 1036F TOBACCO NON-USER: CPT | Performed by: FAMILY MEDICINE

## 2024-10-30 RX ORDER — GABAPENTIN 300 MG/1
300 CAPSULE ORAL DAILY
Qty: 90 CAPSULE | Refills: 1 | Status: SHIPPED | OUTPATIENT
Start: 2024-10-30

## 2024-10-30 RX ORDER — ALPRAZOLAM 1 MG/1
TABLET ORAL
Qty: 7 TABLET | Refills: 0 | Status: SHIPPED | OUTPATIENT
Start: 2024-10-30

## 2024-11-20 LAB — NONINV COLON CA DNA+OCC BLD SCRN STL QL: NEGATIVE

## 2024-12-09 DIAGNOSIS — I10 BENIGN ESSENTIAL HYPERTENSION: ICD-10-CM

## 2024-12-09 RX ORDER — LOSARTAN POTASSIUM AND HYDROCHLOROTHIAZIDE 12.5; 1 MG/1; MG/1
1 TABLET ORAL DAILY
Qty: 90 TABLET | Refills: 2 | Status: SHIPPED | OUTPATIENT
Start: 2024-12-09

## 2024-12-10 DIAGNOSIS — M51.26 LUMBAR HERNIATED DISC: ICD-10-CM

## 2024-12-11 RX ORDER — GABAPENTIN 300 MG/1
300 CAPSULE ORAL DAILY
Qty: 90 CAPSULE | Refills: 1 | Status: SHIPPED | OUTPATIENT
Start: 2024-12-11

## 2024-12-30 DIAGNOSIS — M54.16 LUMBAR RADICULOPATHY: ICD-10-CM

## 2024-12-30 DIAGNOSIS — I10 BENIGN ESSENTIAL HYPERTENSION: ICD-10-CM

## 2024-12-30 RX ORDER — MELOXICAM 15 MG/1
TABLET ORAL
Qty: 90 TABLET | Refills: 3 | Status: SHIPPED | OUTPATIENT
Start: 2024-12-30

## 2024-12-30 RX ORDER — AMLODIPINE BESYLATE 10 MG/1
TABLET ORAL
Qty: 90 TABLET | Refills: 3 | Status: SHIPPED | OUTPATIENT
Start: 2024-12-30

## 2025-01-14 ENCOUNTER — OFFICE VISIT (OUTPATIENT)
Dept: PRIMARY CARE | Facility: CLINIC | Age: 70
End: 2025-01-14
Payer: COMMERCIAL

## 2025-01-14 VITALS
HEIGHT: 71 IN | HEART RATE: 78 BPM | TEMPERATURE: 98 F | BODY MASS INDEX: 26.74 KG/M2 | WEIGHT: 191 LBS | OXYGEN SATURATION: 97 %

## 2025-01-14 DIAGNOSIS — C61 MALIGNANT NEOPLASM OF PROSTATE (MULTI): ICD-10-CM

## 2025-01-14 DIAGNOSIS — R19.7 DIARRHEA, UNSPECIFIED TYPE: Primary | ICD-10-CM

## 2025-01-14 DIAGNOSIS — I10 BENIGN ESSENTIAL HYPERTENSION: ICD-10-CM

## 2025-01-14 DIAGNOSIS — R53.83 FATIGUE, UNSPECIFIED TYPE: ICD-10-CM

## 2025-01-14 DIAGNOSIS — E78.2 COMBINED HYPERLIPIDEMIA: ICD-10-CM

## 2025-01-14 PROCEDURE — 99214 OFFICE O/P EST MOD 30 MIN: CPT | Performed by: FAMILY MEDICINE

## 2025-01-14 PROCEDURE — 1159F MED LIST DOCD IN RCRD: CPT | Performed by: FAMILY MEDICINE

## 2025-01-14 PROCEDURE — 1036F TOBACCO NON-USER: CPT | Performed by: FAMILY MEDICINE

## 2025-01-14 PROCEDURE — 1160F RVW MEDS BY RX/DR IN RCRD: CPT | Performed by: FAMILY MEDICINE

## 2025-01-14 PROCEDURE — 3008F BODY MASS INDEX DOCD: CPT | Performed by: FAMILY MEDICINE

## 2025-01-14 ASSESSMENT — ENCOUNTER SYMPTOMS
UNEXPECTED WEIGHT CHANGE: 0
DIARRHEA: 1
CONSTIPATION: 0
VOMITING: 1
PALPITATIONS: 0

## 2025-01-14 NOTE — PROGRESS NOTES
"Subjective   Patient ID: Nelson Kulkarni is a 69 y.o. male who presents for Sick Visit (Nelson is here today for same day sick visit with c/o abdominal cramping and bloating, diarrhea, and vomiting x3 weeks //162/86 ).    HPI   Started when his family had the stomach flu a couple weeks ago, several days ago he did have a couple days where he was back to normal and then was constipated but is back to diarrhea now  No travel no different foods or liquids  No blood in stool and no nighttime awakening for stool    Review of Systems   Constitutional:  Negative for unexpected weight change.   HENT:  Negative for congestion and ear discharge.    Cardiovascular:  Negative for chest pain and palpitations.   Gastrointestinal:  Positive for diarrhea and vomiting. Negative for constipation.   All other systems reviewed and are negative.      Objective   Pulse 78   Temp 36.7 °C (98 °F) (Temporal)   Ht 1.803 m (5' 11\")   Wt 86.6 kg (191 lb)   SpO2 97%   BMI 26.64 kg/m²     Physical Exam  HENT:      Head: Normocephalic and atraumatic.      Nose: Nose normal.      Mouth/Throat:      Mouth: Mucous membranes are moist.      Pharynx: No oropharyngeal exudate.   Eyes:      Extraocular Movements: Extraocular movements intact.      Conjunctiva/sclera: Conjunctivae normal.      Pupils: Pupils are equal, round, and reactive to light.   Cardiovascular:      Rate and Rhythm: Normal rate and regular rhythm.   Pulmonary:      Effort: Pulmonary effort is normal.      Breath sounds: Normal breath sounds.   Abdominal:      General: There is no distension.      Palpations: Abdomen is soft.   Musculoskeletal:      Cervical back: Normal range of motion and neck supple.   Lymphadenopathy:      Cervical: No cervical adenopathy.   Neurological:      General: No focal deficit present.      Mental Status: He is alert.   Psychiatric:         Attention and Perception: Attention normal.         Speech: Speech normal.         Behavior: Behavior is " cooperative.   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 difficile, PCR    Stool Pathogen Panel, PCR    CBC and Auto Differential    Comprehensive Metabolic Panel    Thyroid Stimulating Hormone    Thyroxine, Free    Fatigue, unspecified type     R53.83    Relevant Orders    CBC and Auto Differential    Comprehensive Metabolic Panel    Thyroid Stimulating Hormone    Thyroxine, Free        Check labs  Dietary factors discussed  Continue Citrucel which patient says he recently started  Recheck 1 week if not better sooner if worse  Discussed with patient possible need for colonoscopy

## 2025-01-15 ENCOUNTER — LAB (OUTPATIENT)
Dept: LAB | Facility: LAB | Age: 70
End: 2025-01-15
Payer: COMMERCIAL

## 2025-01-15 DIAGNOSIS — I10 BENIGN ESSENTIAL HYPERTENSION: ICD-10-CM

## 2025-01-15 DIAGNOSIS — R19.7 DIARRHEA, UNSPECIFIED TYPE: ICD-10-CM

## 2025-01-15 DIAGNOSIS — R53.83 FATIGUE, UNSPECIFIED TYPE: ICD-10-CM

## 2025-01-15 DIAGNOSIS — R97.20 ELEVATED PSA: ICD-10-CM

## 2025-01-15 LAB
ALBUMIN SERPL BCP-MCNC: 4.2 G/DL (ref 3.4–5)
ALP SERPL-CCNC: 47 U/L (ref 33–136)
ALT SERPL W P-5'-P-CCNC: 37 U/L (ref 10–52)
ANION GAP SERPL CALC-SCNC: 13 MMOL/L (ref 10–20)
AST SERPL W P-5'-P-CCNC: 41 U/L (ref 9–39)
BASOPHILS # BLD AUTO: 0.06 X10*3/UL (ref 0–0.1)
BASOPHILS NFR BLD AUTO: 1.2 %
BILIRUB SERPL-MCNC: 0.9 MG/DL (ref 0–1.2)
BUN SERPL-MCNC: 12 MG/DL (ref 6–23)
CALCIUM SERPL-MCNC: 9 MG/DL (ref 8.6–10.3)
CHLORIDE SERPL-SCNC: 102 MMOL/L (ref 98–107)
CO2 SERPL-SCNC: 26 MMOL/L (ref 21–32)
CREAT SERPL-MCNC: 1.07 MG/DL (ref 0.5–1.3)
EGFRCR SERPLBLD CKD-EPI 2021: 76 ML/MIN/1.73M*2
EOSINOPHIL # BLD AUTO: 0.16 X10*3/UL (ref 0–0.7)
EOSINOPHIL NFR BLD AUTO: 3.2 %
ERYTHROCYTE [DISTWIDTH] IN BLOOD BY AUTOMATED COUNT: 12.1 % (ref 11.5–14.5)
GLUCOSE SERPL-MCNC: 80 MG/DL (ref 74–99)
HCT VFR BLD AUTO: 42.7 % (ref 41–52)
HGB BLD-MCNC: 14.9 G/DL (ref 13.5–17.5)
IMM GRANULOCYTES # BLD AUTO: 0.02 X10*3/UL (ref 0–0.7)
IMM GRANULOCYTES NFR BLD AUTO: 0.4 % (ref 0–0.9)
LYMPHOCYTES # BLD AUTO: 1.78 X10*3/UL (ref 1.2–4.8)
LYMPHOCYTES NFR BLD AUTO: 35.3 %
MCH RBC QN AUTO: 34.6 PG (ref 26–34)
MCHC RBC AUTO-ENTMCNC: 34.9 G/DL (ref 32–36)
MCV RBC AUTO: 99 FL (ref 80–100)
MONOCYTES # BLD AUTO: 0.54 X10*3/UL (ref 0.1–1)
MONOCYTES NFR BLD AUTO: 10.7 %
NEUTROPHILS # BLD AUTO: 2.48 X10*3/UL (ref 1.2–7.7)
NEUTROPHILS NFR BLD AUTO: 49.2 %
NRBC BLD-RTO: 0 /100 WBCS (ref 0–0)
PLATELET # BLD AUTO: 283 X10*3/UL (ref 150–450)
POTASSIUM SERPL-SCNC: 4 MMOL/L (ref 3.5–5.3)
PROT SERPL-MCNC: 6.5 G/DL (ref 6.4–8.2)
PSA SERPL-MCNC: 8.6 NG/ML
RBC # BLD AUTO: 4.31 X10*6/UL (ref 4.5–5.9)
SODIUM SERPL-SCNC: 137 MMOL/L (ref 136–145)
T4 FREE SERPL-MCNC: 0.87 NG/DL (ref 0.61–1.12)
TSH SERPL-ACNC: 1.2 MIU/L (ref 0.44–3.98)
WBC # BLD AUTO: 5 X10*3/UL (ref 4.4–11.3)

## 2025-01-15 PROCEDURE — 84443 ASSAY THYROID STIM HORMONE: CPT

## 2025-01-15 PROCEDURE — 85025 COMPLETE CBC W/AUTO DIFF WBC: CPT

## 2025-01-15 PROCEDURE — 80053 COMPREHEN METABOLIC PANEL: CPT

## 2025-01-15 PROCEDURE — 84439 ASSAY OF FREE THYROXINE: CPT

## 2025-01-15 PROCEDURE — 84153 ASSAY OF PSA TOTAL: CPT

## 2025-01-16 ENCOUNTER — LAB (OUTPATIENT)
Dept: LAB | Facility: LAB | Age: 70
End: 2025-01-16
Payer: COMMERCIAL

## 2025-01-16 PROCEDURE — 87493 C DIFF AMPLIFIED PROBE: CPT

## 2025-01-17 LAB — C DIF TOX TCDA+TCDB STL QL NAA+PROBE: NOT DETECTED

## 2025-01-23 DIAGNOSIS — R19.7 DIARRHEA, UNSPECIFIED TYPE: Primary | ICD-10-CM

## 2025-01-30 ENCOUNTER — APPOINTMENT (OUTPATIENT)
Dept: GASTROENTEROLOGY | Facility: CLINIC | Age: 70
End: 2025-01-30
Payer: COMMERCIAL

## 2025-01-30 VITALS
WEIGHT: 190 LBS | DIASTOLIC BLOOD PRESSURE: 86 MMHG | BODY MASS INDEX: 26.5 KG/M2 | SYSTOLIC BLOOD PRESSURE: 172 MMHG | HEART RATE: 80 BPM

## 2025-01-30 DIAGNOSIS — R19.7 DIARRHEA, UNSPECIFIED TYPE: Primary | ICD-10-CM

## 2025-01-30 PROCEDURE — 99203 OFFICE O/P NEW LOW 30 MIN: CPT | Performed by: INTERNAL MEDICINE

## 2025-01-30 PROCEDURE — 1036F TOBACCO NON-USER: CPT | Performed by: INTERNAL MEDICINE

## 2025-01-30 PROCEDURE — 3079F DIAST BP 80-89 MM HG: CPT | Performed by: INTERNAL MEDICINE

## 2025-01-30 PROCEDURE — 3077F SYST BP >= 140 MM HG: CPT | Performed by: INTERNAL MEDICINE

## 2025-01-30 PROCEDURE — 1159F MED LIST DOCD IN RCRD: CPT | Performed by: INTERNAL MEDICINE

## 2025-01-30 ASSESSMENT — ENCOUNTER SYMPTOMS
ABDOMINAL PAIN: 1
DIARRHEA: 1

## 2025-01-30 NOTE — LETTER
January 30, 2025     Booker Ford MD  [address]  [address]  [facility] [address]    Patient: Nelson Kulkarni   YOB: 1955   Date of Visit: 1/30/2025       Dear Dr. oBoker Ford MD:    Thank you for referring Nelson Kulkarni to me for evaluation. Below are my notes for this consultation.  If you have questions, please do not hesitate to call me. I look forward to following your patient along with you.       Sincerely,     Glenna Yanes MD      CC: No Recipients  ______________________________________________________________________________________    Subjective  Patient ID: Nelson Kulkarni is a 70 y.o. male who presents for Vomiting and Diarrhea.    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  Around December 20, 2024 he had episode of vomiting diarrhea which resolved in 24 hours, was doing okay for 2 days then experiences diarrhea-once around 2 PM with abdominal cramp, nausea.  Symptoms continue till now although he had normal bowel movement intermittently.  As he is working with admitted patient he is wondering about infection.  One of his colleagues was diagnosed E. coli.  He denies vomiting fever melena/hematochezia.  No recent travel out of country.  No recent antibiotic.  PMD checked stool C. difficile which came negative.  Stool sample was collected for stool pathogen panel, fecal calprotectin-reports still pending.  No previous colonoscopy however he had negative Cologuard on 11/12/2024.  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     12  Point ROS negative outside of symptoms stated above in HPI    Past Medical History:   Diagnosis Date   • Anxiety    • Arthritis    • Eczema    • Hypertension    • Shingles 08/23/2023       Past Surgical History:   Procedure Laterality Date   • CIRCUMCISION, PRIMARY     • FRACTURE SURGERY         No relevant family history has been documented for this patient.     reports that he quit smoking about 25 years ago. His smoking use included cigarettes. He started smoking about 35 years ago. He has a 10 pack-year smoking history. He has never used smokeless tobacco. He reports current alcohol use of about 7.0 standard drinks of alcohol per week. He reports that he does not use drugs.    Allergies   Allergen Reactions   • Bee Venom Protein (Honey Bee) Shortness of breath and Palpitations   • Diclofenac Sodium Unknown   • Diclofenac Other and Rash            Objective        Current Outpatient Medications:   •  ALPRAZolam (Xanax) 1 mg tablet, 1 po one hour prior to flying, Disp: 7 tablet, Rfl: 0  •  amLODIPine (Norvasc) 10 mg tablet, TAKE 1 TABLET DAILY, Disp: 90 tablet, Rfl: 3  •  EPINEPHrine (EpiPen 2-Grant) 0.3 mg/0.3 mL injection syringe, Inject 0.3 mL (0.3 mg) as directed if needed for anaphylaxis. Inject into upper leg. Call 911 after use., Disp: 1 each, Rfl: 1  •  escitalopram (Lexapro) 10 mg tablet, TAKE 1 TABLET DAILY, Disp: 90 tablet, Rfl: 3  •  gabapentin (Neurontin) 300 mg capsule, TAKE 1 CAPSULE DAILY, Disp: 90 capsule, Rfl: 1  •  losartan-hydrochlorothiazide (Hyzaar) 100-12.5 mg tablet, TAKE 1 TABLET DAILY, Disp: 90 tablet, Rfl: 2  •  sildenafil (Revatio) 20 mg tablet, Take 2 tablets by mouth one hour prior to sex as needed. Do not take m, Disp: , Rfl:   •  meloxicam (Mobic) 15 mg tablet, TAKE 1 TABLET DAILY (Patient not taking: Reported on 1/30/2025), Disp: 90 tablet, Rfl: 3      Physical Exam  HENT:      Mouth/Throat:      Mouth: Mucous membranes are moist.   Eyes:      General: No scleral  icterus.  Cardiovascular:      Rate and Rhythm: Normal rate and regular rhythm.   Pulmonary:      Effort: Pulmonary effort is normal. No respiratory distress.      Breath sounds: Normal breath sounds.   Abdominal:      General: Abdomen is flat. Bowel sounds are normal.      Palpations: Abdomen is soft.   Musculoskeletal:      Right lower leg: No edema.      Left lower leg: No edema.   Neurological:      Mental Status: He is alert and oriented to person, place, and time.            Assessment/Plan   Diarrhea suspect postinfectious IBSd, rule out concurrent infection      Stool sample collected for stool pathogen panel, fecal calprotectin.  May need to check Giardia, Cryptosporidium  Continue to watch   Supportive measure, hydration  follow-up with me in 6 months as needed

## 2025-01-30 NOTE — PROGRESS NOTES
Subjective   Patient ID: Nelson Kulkarni is a 70 y.o. male who presents for Vomiting and Diarrhea.    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  Around December 20, 2024 he had episode of vomiting diarrhea which resolved in 24 hours, was doing okay for 2 days then experiences diarrhea-once around 2 PM with abdominal cramp, nausea.  Symptoms continue till now although he had normal bowel movement intermittently.  As he is working with admitted patient he is wondering about infection.  One of his colleagues was diagnosed E. coli.  He denies vomiting fever melena/hematochezia.  No recent travel out of country.  No recent antibiotic.  PMD checked stool C. difficile which came negative.  Stool sample was collected for stool pathogen panel, fecal calprotectin-reports still pending.  No previous colonoscopy however he had negative Cologuard on 11/12/2024.  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     12 Point ROS negative outside of symptoms stated above in HPI    Past Medical History:   Diagnosis Date    Anxiety     Arthritis     Eczema     Hypertension     Shingles 08/23/2023       Past Surgical History:   Procedure Laterality Date    CIRCUMCISION, PRIMARY      FRACTURE SURGERY         No relevant family history has been documented for this patient.     reports that he quit smoking about 25 years ago. His smoking use included cigarettes. He started smoking about 35 years ago. He has a 10 pack-year smoking history. He has never used smokeless tobacco. He reports current  alcohol use of about 7.0 standard drinks of alcohol per week. He reports that he does not use drugs.    Allergies   Allergen Reactions    Bee Venom Protein (Honey Bee) Shortness of breath and Palpitations    Diclofenac Sodium Unknown    Diclofenac Other and Rash            Objective         Current Outpatient Medications:     ALPRAZolam (Xanax) 1 mg tablet, 1 po one hour prior to flying, Disp: 7 tablet, Rfl: 0    amLODIPine (Norvasc) 10 mg tablet, TAKE 1 TABLET DAILY, Disp: 90 tablet, Rfl: 3    EPINEPHrine (EpiPen 2-Grant) 0.3 mg/0.3 mL injection syringe, Inject 0.3 mL (0.3 mg) as directed if needed for anaphylaxis. Inject into upper leg. Call 911 after use., Disp: 1 each, Rfl: 1    escitalopram (Lexapro) 10 mg tablet, TAKE 1 TABLET DAILY, Disp: 90 tablet, Rfl: 3    gabapentin (Neurontin) 300 mg capsule, TAKE 1 CAPSULE DAILY, Disp: 90 capsule, Rfl: 1    losartan-hydrochlorothiazide (Hyzaar) 100-12.5 mg tablet, TAKE 1 TABLET DAILY, Disp: 90 tablet, Rfl: 2    sildenafil (Revatio) 20 mg tablet, Take 2 tablets by mouth one hour prior to sex as needed. Do not take m, Disp: , Rfl:     meloxicam (Mobic) 15 mg tablet, TAKE 1 TABLET DAILY (Patient not taking: Reported on 1/30/2025), Disp: 90 tablet, Rfl: 3      Physical Exam  HENT:      Mouth/Throat:      Mouth: Mucous membranes are moist.   Eyes:      General: No scleral icterus.  Cardiovascular:      Rate and Rhythm: Normal rate and regular rhythm.   Pulmonary:      Effort: Pulmonary effort is normal. No respiratory distress.      Breath sounds: Normal breath sounds.   Abdominal:      General: Abdomen is flat. Bowel sounds are normal.      Palpations: Abdomen is soft.   Musculoskeletal:      Right lower leg: No edema.      Left lower leg: No edema.   Neurological:      Mental Status: He is alert and oriented to person, place, and time.            Assessment/Plan    Diarrhea suspect postinfectious IBSd, rule out concurrent infection      Stool sample collected for stool  pathogen panel, fecal calprotectin.  May need to check Giardia, Cryptosporidium  Continue to watch   Supportive measure, hydration  follow-up with me in 6 months as needed

## 2025-01-30 NOTE — PATIENT INSTRUCTIONS
Stool sample collected for stool pathogen panel, fecal calprotectin.  May need to check Giardia, Cryptosporidium  Continue to watch   Supportive measure, hydration  follow-up with me in 6 months as needed

## 2025-02-05 LAB — CALPROTECTIN STL-MCNT: <5 MCG/G

## 2025-02-25 NOTE — PROGRESS NOTES
Subjective   Patient ID: Nelson Kulkarni is a 70 y.o. male.      HPI  70 y.o. male presents for a follow up visit related to an active surveillance of prostate cancer. Recent PSA level 8.6. Most recent MRI indicated BPH changes PI-RADS 2. He underwent a biopsy that indicated a prostatic adenocarcinoma , Warren score 3+3=6, GG1.     The patient does not have sexual activity as often as he would like. The patient states that he does not take the sildenafil often.     Lab Results   Component Value Date    PSA 8.60 (H) 01/15/2025    PSA 5.92 (H) 08/19/2024    PSA 6.57 (H) 01/19/2024    PSA 4.99 (H) 02/21/2023         Review of Systems  A complete review of systems was performed. All systems are noted to be negative unless indicated in the history of present illness, impression, active problem list, or past histories.     Objective   Physical Exam    Assessment/Plan   Diagnoses and all orders for this visit:  Malignant neoplasm of prostate (Multi)  -     PSA; Future  -     MR prostate with saji boundaries if pirads 3 or above; Future      70 y.o. male presents for a follow up visit related to an active surveillance of prostate cancer. Recent PSA level 8.6. Most recent MRI indicated BPH changes PI-RADS 2. He underwent a biopsy that indicated a prostatic adenocarcinoma , Amy score 3+3=6, GG1.     I personally reviewed the PSA level that has elevated from 5.9 in 08/2024 to 8.6 in 01/2025. The patient's PSA has risen quickly in 5 months, this is not justifiable. I reviewed this result with the patient as well as the etiology regarding the PSA rising quickly.     I reviewed with the patient that we will obtain another PSA in 3 months. If the PSA continues to elevate or is stable at 7 or 8 we will obtain a MRI. I reviewed that the cancer could be a higher grade and become more localized.     When we obtain the PSA, if the level is under 7 or 8 we will defer the MRI at that time. I have advised the patient to reach out via  EPIC when he has his results to formulate a plan.       Plan:  PSA mid 04/2025  MRI 05/2025  FUV via EPIC by patient    Scribe Attestation   By signing my name below, I, Alex Mesa, Scribe attestation that this documentation has been prepared under the direction and in the presence of Aden Parikh MD.

## 2025-02-26 ENCOUNTER — APPOINTMENT (OUTPATIENT)
Dept: UROLOGY | Facility: CLINIC | Age: 70
End: 2025-02-26
Payer: COMMERCIAL

## 2025-02-26 DIAGNOSIS — C61 MALIGNANT NEOPLASM OF PROSTATE (MULTI): ICD-10-CM

## 2025-02-26 PROCEDURE — G2211 COMPLEX E/M VISIT ADD ON: HCPCS | Performed by: STUDENT IN AN ORGANIZED HEALTH CARE EDUCATION/TRAINING PROGRAM

## 2025-02-26 PROCEDURE — 99213 OFFICE O/P EST LOW 20 MIN: CPT | Performed by: STUDENT IN AN ORGANIZED HEALTH CARE EDUCATION/TRAINING PROGRAM

## 2025-02-26 NOTE — PATIENT INSTRUCTIONS
Please go to nearest outpatient  lab to get labs obtained in April    Please call Radiology at [phone] to schedule imaging for early May    Follow up visit in May, after MRI

## 2025-03-06 ENCOUNTER — APPOINTMENT (OUTPATIENT)
Dept: GASTROENTEROLOGY | Facility: CLINIC | Age: 70
End: 2025-03-06
Payer: COMMERCIAL

## 2025-04-01 DIAGNOSIS — C61 MALIGNANT NEOPLASM OF PROSTATE (MULTI): ICD-10-CM

## 2025-05-15 LAB — PSA SERPL-MCNC: 7.16 NG/ML

## 2025-05-18 NOTE — PROGRESS NOTES
Subjective   Patient ID: Nelson Kulkarni is a 70 y.o. male.      HPI  70 y.o. male presents for a follow up visit regarding active surveillance of prostate cancer. Recent PSA level 7.16. Most recent MRI indicated BPH changes PI-RADS 2. He underwent a biopsy that indicated a prostatic adenocarcinoma , Kyle score 3+3=6, GG1.     He did not complete the recently ordered MRI of the prostate.     He has never tried Cialis. He has previously tried sildenafil. He wouldn't mind trying another medication in relation to ED.     Lab Results   Component Value Date    PSA 7.16 (H) 05/14/2025    PSA 8.60 (H) 01/15/2025    PSA 5.92 (H) 08/19/2024    PSA 6.57 (H) 01/19/2024    PSA 4.99 (H) 02/21/2023         Review of Systems  A complete review of systems was performed. All systems are noted to be negative unless indicated in the history of present illness, impression, active problem list, or past histories.     Objective   Physical Exam    Assessment/Plan   Diagnoses and all orders for this visit:  Elevated PSA  -     Prostate Specific Antigen; Future  Benign prostatic hyperplasia without lower urinary tract symptoms  -     tadalafil (Cialis) 5 mg tablet; Take 1 tablet (5 mg) by mouth once daily.  Erectile dysfunction, unspecified erectile dysfunction type  -     tadalafil 20 mg tablet; Take 1 tablet (20 mg) by mouth if needed for erectile dysfunction (take 1 pill 1-2hrs before sexual acitivty).      70 y.o. male presents for a follow up visit regarding active surveillance of prostate cancer. Recent PSA level 7.16. Most recent MRI indicated BPH changes PI-RADS 2. He underwent a biopsy that indicated a prostatic adenocarcinoma , Kyle score 3+3=6, GG1.     I personally reviewed the PSA level that is elevated at 7.16. It has decreased slightly in 4 months from 8.6. The PSA level decreased significantly. There is a good chance, that if we wait longer he will have another decrease.     I personally reviewed the patient's last MRI  of the prostate. The patient's prostate size was 44 grams. The size of the prostate is not large enough for PSA level elevation.     I have advised the patient that the repeat of the MRI of the prostate is not necessary. I would recommend that he have another PSA level in 3 months from his last reading. The PSA level will be ordered for 08/2025.    The MRI is not necessary at this time. I would recommend that he have another PSA level prior to discussing the requirement for another MRI of the prostate.     I explained to the patient that erectile dysfunction can be of organic or nonorganic etiologies. Organic etiologies includes vascular or neuropathic related to vasculopathy, diabetes, metabolic syndrome, smoking, anatomical such as penile plaques that can result in erectile dysfunction and penile curvature. Nonorganic etiologies include psychogenic reasons related to desire, proper stimulation, and anxiety such as performance anxiety among other reasons.    The available treatments include oral treatment with PDE 5 inhibitors given either as a once daily low-dose or on-demand high-dose, with the possibility to combine both if tolerated. Intra-urethral suppositories are other options but are rarely used. Intracavernosal injections of prostaglandins or mixture of pharmacological agents to achieve erection on demand are usually relied on if PDE 5 inhibitors are not effective. The last resort will be penile prosthesis/implant, of which several models include.    I explained to the patient that I manage the first line of treatment with PDE 5 inhibitors, however I would refer him for a men's health nurse or urologist who specializes in this condition for discussion of more advanced treatment options whenever needed.  I went over the side effects of PDE 5 inhibitors which include headache, nasal congestion, facial flushing, GI discomfort and low back pain, in addition to the more concerning yet rare priapism which  consists of more than 4 hours of erection that could lead to penile pain, and would be a medical emergency requiring presentation to the emergency department.    The patient understands the management plan, and asked questions that reflected his engagement in the discussion.    I will order the patient Cialis 5 mg daily as the patient and his partner do not like to have to wait for the on demand medication to work. I will also have the patient start on Cialis 20 mg on demand, if required. I discussed that the medication is most effective on an empty stomach 2 hours prior to intercourse.     Plan:  Start Cialis 5 mg daily   Start Cialis 20 mg PRN 2 hours prior to intercourse on an empty stomach  PSA level end of 08/2025  FUV with urology       Scribe Attestation   By signing my name below, I, Alex Mesa, Scribe attestation that this documentation has been prepared under the direction and in the presence of Aden Parikh MD.

## 2025-05-19 ENCOUNTER — APPOINTMENT (OUTPATIENT)
Dept: UROLOGY | Facility: CLINIC | Age: 70
End: 2025-05-19
Payer: COMMERCIAL

## 2025-05-19 DIAGNOSIS — N40.0 BENIGN PROSTATIC HYPERPLASIA WITHOUT LOWER URINARY TRACT SYMPTOMS: ICD-10-CM

## 2025-05-19 DIAGNOSIS — N52.9 ERECTILE DYSFUNCTION, UNSPECIFIED ERECTILE DYSFUNCTION TYPE: ICD-10-CM

## 2025-05-19 DIAGNOSIS — R97.20 ELEVATED PSA: ICD-10-CM

## 2025-05-19 PROCEDURE — 99214 OFFICE O/P EST MOD 30 MIN: CPT | Performed by: STUDENT IN AN ORGANIZED HEALTH CARE EDUCATION/TRAINING PROGRAM

## 2025-05-20 RX ORDER — TADALAFIL 5 MG/1
5 TABLET ORAL DAILY
Qty: 30 TABLET | Refills: 11 | Status: SHIPPED | OUTPATIENT
Start: 2025-05-20 | End: 2026-05-20

## 2025-05-20 RX ORDER — TADALAFIL 20 MG/1
20 TABLET ORAL AS NEEDED
Qty: 10 TABLET | Refills: 11 | Status: SHIPPED | OUTPATIENT
Start: 2025-05-20 | End: 2026-05-20

## 2025-06-10 DIAGNOSIS — F41.1 GENERALIZED ANXIETY DISORDER: ICD-10-CM

## 2025-06-10 RX ORDER — ESCITALOPRAM OXALATE 10 MG/1
10 TABLET ORAL DAILY
Qty: 90 TABLET | Refills: 0 | Status: SHIPPED | OUTPATIENT
Start: 2025-06-10

## 2025-08-01 DIAGNOSIS — R97.20 ELEVATED PSA: ICD-10-CM

## 2025-08-09 DIAGNOSIS — M51.26 LUMBAR HERNIATED DISC: ICD-10-CM

## 2025-08-11 RX ORDER — GABAPENTIN 300 MG/1
300 CAPSULE ORAL DAILY
Qty: 90 CAPSULE | Refills: 0 | Status: SHIPPED | OUTPATIENT
Start: 2025-08-11